# Patient Record
Sex: FEMALE | Race: WHITE | NOT HISPANIC OR LATINO | Employment: FULL TIME | URBAN - METROPOLITAN AREA
[De-identification: names, ages, dates, MRNs, and addresses within clinical notes are randomized per-mention and may not be internally consistent; named-entity substitution may affect disease eponyms.]

---

## 2023-03-16 ENCOUNTER — OFFICE VISIT (OUTPATIENT)
Dept: DIABETES | Facility: CLINIC | Age: 38
End: 2023-03-16
Payer: MEDICAID

## 2023-03-16 DIAGNOSIS — I10 HYPERTENSION GOAL BP (BLOOD PRESSURE) < 130/80: ICD-10-CM

## 2023-03-16 DIAGNOSIS — E78.5 HYPERLIPIDEMIA LDL GOAL <100: ICD-10-CM

## 2023-03-16 DIAGNOSIS — E10.9 TYPE 1 DIABETES MELLITUS WITHOUT COMPLICATION: Primary | ICD-10-CM

## 2023-03-16 DIAGNOSIS — Z96.41 INSULIN PUMP IN PLACE: ICD-10-CM

## 2023-03-16 PROBLEM — Z87.19 HISTORY OF GI BLEED: Status: ACTIVE | Noted: 2020-04-24

## 2023-03-16 PROBLEM — Z97.8 USES SELF-APPLIED CONTINUOUS GLUCOSE MONITORING DEVICE: Status: ACTIVE | Noted: 2022-08-05

## 2023-03-16 PROBLEM — K21.9 GASTROESOPHAGEAL REFLUX DISEASE WITHOUT ESOPHAGITIS: Status: ACTIVE | Noted: 2019-02-06

## 2023-03-16 PROBLEM — H81.09 MENIERE'S DISEASE: Status: ACTIVE | Noted: 2022-06-22

## 2023-03-16 PROCEDURE — 99204 PR OFFICE/OUTPT VISIT, NEW, LEVL IV, 45-59 MIN: ICD-10-PCS | Mod: 95,,, | Performed by: NURSE PRACTITIONER

## 2023-03-16 PROCEDURE — 99204 OFFICE O/P NEW MOD 45 MIN: CPT | Mod: 95,,, | Performed by: NURSE PRACTITIONER

## 2023-03-16 NOTE — PROGRESS NOTES
Telemedicine Visit    The patient location is home  The chief complaint leading to consultation is: Diabetes    Visit type: audiovisual VIA DOXMITY    Face to Face time with patient: 25  50 minutes of total time spent on the encounter, which includes face to face time and non-face to face time preparing to see the patient (eg, review of tests), Obtaining and/or reviewing separately obtained history, Documenting clinical information in the electronic or other health record, Independently interpreting results (not separately reported) and communicating results to the patient/family/caregiver, or Care coordination (not separately reported).  Each patient to whom he or she provides medical services by telemedicine is:  (1) informed of the relationship between the physician and patient and the respective role of any other health care provider with respect to management of the patient; and (2) notified that he or she may decline to receive medical services by telemedicine and may withdraw from such care at any time.  Notes:         Dominga Saldaña is a 37 y.o. 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     CHIEF COMPLAINT: Diabetes Consultation    PCP: Primary Doctor No     The patient was initially diagnosed with diabetes at age 14.   Patient previously followed by me at [facility], last visit 1/6/2023.    Previous failed treatments include:      Social Documentation:  Patient lives in [place] with .  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  Exercise: bowling     Current monitoring regimen: continuous glucose monitor. Device data to be uploaded to media section. See AGP data below.      The patient's Dexcom CGM was downloaded and reviewed. For the past 14 days, patient average glucose was 268 mg/dL, with standard deviation of 48. She was above  range 96% of the time, in range 4% of the time, and below range 0% of the time. The target range for this patient was 70 - 180 mg/dL. Overall, there was a pattern of hyperglycemia..      Current Diabetes related symptoms/problems include hyperglycemia and have been unchanged.     Diabetes related complications:   none.   denies Pancreatitis  denies Gastroparesis  denies DKA  denies Hx/family Hx of MEN2/MTC  denies Frequent UTIs/yeast infections    Cardiovascular Risk Factors: obesity (BMI >30 kg/m2).    Any episodes of hypoglycemia?  Yes. Hypoglycemia treatment reviewed with patient and education to be provided in AVS.   Hypoglycemia Unawareness? yes  Severe hypoglycemia requiring 3rd party? no    Seen by Diabetes Education in last year? yes    Diabetes Medications               insulin lispro (HUMALOG) 100 unit/mL injection Inject into the skin 3 (three) times daily before meals. Injection in pump three times per day - MEDTRONIC INSULIN PUMP     DIABETES DISEASE MANAGEMENT STATUS  Statin: Taking  ACE/ARB: Not taking  Screening or Prevention Patient's value Goal Complete/Controlled?   HgA1C Testing and Control   Lab Results   Component Value Date    HGBA1C 7.2 (H) 01/06/2023      Annually/Less than 8% Yes     Lipid profile : 03/17/2022 Annually No     LDL control Lab Results   Component Value Date    LDLCALC 67 03/17/2022    Annually/Less than 100 mg/dl  Yes     Nephropathy screening No results found for: LABMICR  No results found for: PROTEINUA  No results found for: UTPCR   Annually No     Blood pressure BP Readings from Last 1 Encounters:   01/10/12 100/60    Less than 140/90 Yes     Dilated retinal exam Most Recent Eye Exam Date: Not Found Annually No     Foot exam   Most Recent Foot Exam Date: Not Found Annually No     Patient's medications, allergies, past medical, surgical, social and family histories were reviewed and updated as appropriate.     Review of Systems   Constitutional:  Negative for weight loss.    Eyes:  Negative for blurred vision and double vision.   Cardiovascular:  Negative for chest pain.   Gastrointestinal:  Negative for nausea and vomiting.   Genitourinary:  Negative for frequency.   Musculoskeletal:  Negative for falls.   Neurological:  Negative for dizziness and weakness.   Endo/Heme/Allergies:  Negative for polydipsia.   Psychiatric/Behavioral:  Negative for depression.    All other systems reviewed and are negative.          Physical Exam  Constitutional:       Appearance: Normal appearance.   HENT:      Head: Normocephalic and atraumatic.   Pulmonary:      Effort: No respiratory distress.   Musculoskeletal:      Cervical back: Normal range of motion.   Neurological:      Mental Status: She is alert and oriented to person, place, and time.   Psychiatric:         Mood and Affect: Mood normal.         Behavior: Behavior normal.      There were no vitals taken for this visit.  Wt Readings from Last 3 Encounters:   02/22/12 88 kg (194 lb)   01/10/12 86.6 kg (191 lb)   12/20/11 87.1 kg (192 lb)       LAB REVIEW  Lab Results   Component Value Date     09/07/2011    K 3.4 (L) 09/07/2011     09/07/2011    CO2 21 (L) 09/07/2011    BUN 7 09/07/2011    CREATININE 0.6 09/07/2011    CALCIUM 9.2 09/07/2011    ANIONGAP 12 09/07/2011     No results found for: CPEPTIDE, GLUTAMICACID, INSLNABS  Hemoglobin A1C   Date Value Ref Range Status   01/06/2023 7.2 (H) 4.7 - 5.6 % Final   09/29/2022 7.2 (H) 4.7 - 5.6 % Final   06/30/2022 7.1 (H) 4.7 - 5.6 % Final   05/12/2011 6.1 4.0 - 6.2 % Final       ASSESSMENT    ICD-10-CM ICD-9-CM   1. Type 1 diabetes mellitus without complication  E10.9 250.01   2. Insulin pump in place  Z96.41 V45.85   3. Hypertension goal BP (blood pressure) < 130/80  I10 401.9   4. Hyperlipidemia LDL goal <100  E78.5 272.4        PLAN  Diagnoses and all orders for this visit:    Type 1 diabetes mellitus without complication    Insulin pump in place    Hypertension goal BP (blood  pressure) < 130/80    Hyperlipidemia LDL goal <100        Reviewed pathophysiology of diabetes, complications related to the disease, importance of annual dilated eye exam and daily foot examination. Explained MOA, SE, dosage of medications. Written instructions given and reviewed with patient and patient verbalizes understanding.     Today: For the past 14 days, patient average glucose was 268 mg/dL, with standard deviation of 48. She was above range 96% of the time, in range 4% of the time, and below range 0% of the time. The target range for this patient was 70 - 180 mg/dL. Overall, there was a pattern of hyperglycemia. Patient started new 770G pump last week, states she is unsure is she put in the correct settings. Will set up data sharing with [employer] once patient has login info. Scheduled for new pump training with [employer] next week.     PATIENT INSTRUCTIONS    Call with pump settings today.   Continue Medtronic 770G Insulin Pump  Continue Dexcom G6 CGM      Follow up in about 2 weeks (around 3/30/2023) for VIRTUAL, DEXCOM - SHARING, [employer]    Portions of this note were prepared with [employer] Naturally Speaking voice recognition transcription software. Grammatical errors, including garbled syntax, mangle pronouns, and other bizarre constructions may be attributed to that software system.

## 2023-03-16 NOTE — PATIENT INSTRUCTIONS
PATIENT INSTRUCTIONS    Call with pump settings today.   Continue Medtronic 770G Insulin Pump  Continue Dexcom G6 CGM

## 2023-03-30 ENCOUNTER — OFFICE VISIT (OUTPATIENT)
Dept: DIABETES | Facility: CLINIC | Age: 38
End: 2023-03-30
Payer: MEDICAID

## 2023-03-30 DIAGNOSIS — Z96.41 INSULIN PUMP IN PLACE: ICD-10-CM

## 2023-03-30 DIAGNOSIS — E10.9 TYPE 1 DIABETES MELLITUS WITHOUT COMPLICATION: Primary | ICD-10-CM

## 2023-03-30 DIAGNOSIS — Z97.8 USES SELF-APPLIED CONTINUOUS GLUCOSE MONITORING DEVICE: ICD-10-CM

## 2023-03-30 PROCEDURE — 99214 PR OFFICE/OUTPT VISIT, EST, LEVL IV, 30-39 MIN: ICD-10-PCS | Mod: 95,,, | Performed by: NURSE PRACTITIONER

## 2023-03-30 PROCEDURE — 99214 OFFICE O/P EST MOD 30 MIN: CPT | Mod: 95,,, | Performed by: NURSE PRACTITIONER

## 2023-03-30 NOTE — PATIENT INSTRUCTIONS
PATIENT INSTRUCTIONS     Fasting labs to be done 1 week prior to follow up appointment with me.    Continue Medtronic 770G Insulin Pump  Basal Carb Ratio ISF Target/s AIT   0000 - 1.5  0800 - 1.5  2000 - 2.0 - Increase to 2.4 13 50 140 4 hrs     Continue Dexcom G6 CGM  Blood Sugar Goals:       Fastin-130.       1-2 hours after a meal: Less than 180.         For low blood sugar between 55-69 mg/dL, raise it by following the 15-15 rule: have 15 grams of carbs and check your blood sugar after 15 minutes. If its still below your target range, have another serving. Repeat these steps until its in your target range. Once its in range, eat a nutritious meal or snack to ensure it doesnt get too low again.    These items have about 15 grams of carbs:  4 ounces (½ cup) of juice or regular soda.  1 tablespoon of sugar, honey, or syrup.  3-4 glucose tablets.  1 dose of glucose gel (usually 1 tube; follow instructions).    NOTIFY YOUR DIABETES MANAGEMENT PROVIDER FOR ANY GLUCOSE LESS THAN 70.           [facility]  DIABETES MANAGEMENT VIRTUAL VISITS    Here are a few tips and recommendations for you to be best prepared for your upcoming virtual visit with [facility]:    If you do not have a [facility] account already set up on your smart phone or computer, please call [phone] for assistance. The [facility] Patient Support Team is available Monday - Friday: 7 a.m. - 7 p.m.    If you use a Continuous Glucose Monitor (Dexcom/Pranay/Guardian) or an insulin pump and do not share data automatically using a smartphone, please call to schedule a nurse visit 1-3 days ahead of your virtual visit to download your glucose data.     For patients checking their blood sugar using a glucose monitor with finger sticks, please have a log of blood sugars to review with your provider. You may attach a picture or screenshot in a message to your provider in the [facility] portal.     Our staff will call you the day  before your visit to confirm your appointment. Please call our office, [phone], if you are not able to attend your virtual visit or to reschedule. You can also send a message to our staff in your [facility] portal.     Please log in to your [facility] account for your virtual visit 15-20 minutes prior to appointment time to complete your pre-check questionnaire and televisit consent.     What are the technical requirements for a Virtual Visit?  You must have a smartphone, mobile tablet, laptop and/or desktop computer.  Microphone and webcam capabilities on your device  If using a mobile phone or tablet, you will need:  iOS or Android operating system  The [facility] louie installed and [facility] selected as your healthcare provider  You can find the [facility] louie in the [employer] Store (Lionicalhone) and Google Play Store (Android).  If you do not plan to be at home during your virtual visit, we recommend finding a private, quiet spot for your appointment. Avoid conducting your visit in your vehicle, especially while driving

## 2023-03-30 NOTE — PROGRESS NOTES
The patient location is: Home  The chief complaint leading to consultation is: Diabetes    Visit type: audiovisual via doximity.    Face to Face time with patient: 20  30 minutes of total time spent on the encounter, which includes face to face time and non-face to face time preparing to see the patient (eg, review of tests), Obtaining and/or reviewing separately obtained history, Documenting clinical information in the electronic or other health record, Independently interpreting results (not separately reported) and communicating results to the patient/family/caregiver, or Care coordination (not separately reported).  Each patient to whom he or she provides medical services by telemedicine is:  (1) informed of the relationship between the physician and patient and the respective role of any other health care provider with respect to management of the patient; and (2) notified that he or she may decline to receive medical services by telemedicine and may withdraw from such care at any time.    Notes:    Dominga Saldaña is a 37 y.o. female who presents for a follow up evaluation of Type 1 diabetes mellitus.     CHIEF COMPLAINT: Diabetes Consultation    PCP: Primary Doctor No      Initial visit with me - 3/16/2023    The patient was initially diagnosed with diabetes at age 14.   Patient previously followed by me at [facility], last visit 1/6/2023.     Previous failed treatments include:        Social Documentation:  Patient lives in [place] with .  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  Exercise: bowling     Diabetes related complications:   none.   denies Pancreatitis  denies Gastroparesis  denies DKA  denies Hx/family Hx of MEN2/MTC  denies Frequent UTIs/yeast infections     Cardiovascular Risk Factors: obesity (BMI >30 kg/m2).    Diabetes Medications               insulin lispro (HUMALOG) 100 unit/mL injection Inject into the skin 3 (three) times daily before meals. VIA INSULIN  PUMP.  MEDTRONIC MINIMED 770G       Current monitoring regimen: continuous glucose monitor. Device data to be uploaded to media section. See AGP data below.       The patient's Dexcom CGM was downloaded and reviewed. For the past 14 days, patient average glucose was 199 mg/dL, with standard deviation of 31. She was above range 57% of the time, in range 43% of the time, and below range 0% of the time. The target range for this patient was 70 - 180 mg/dL. Overall, there was a pattern of improvement since last visit.   .    Recent hypoglycemic episodes: No.   Patient compliant with glucose checks and medication administration? Yes    DIABETES MANAGEMENT STATUS  Statin: Not taking  ACE/ARB: Not taking  Screening or Prevention Patient's value Goal Complete/Controlled?   HgA1C Testing and Control   Lab Results   Component Value Date    HGBA1C 7.2 (H) 01/06/2023      Annually/Less than 8% Yes     Lipid profile : 03/17/2022 Annually No     LDL control Lab Results   Component Value Date    LDLCALC 67 03/17/2022    Annually/Less than 100 mg/dl  No     Nephropathy screening No results found for: LABMICR  No results found for: PROTEINUA  No results found for: UTPCR   Annually No     Blood pressure BP Readings from Last 1 Encounters:   01/10/12 100/60    Less than 140/90 Yes     Dilated retinal exam Most Recent Eye Exam Date: Not Found Annually No     Foot exam   Most Recent Foot Exam Date: Not Found Annually No     Patient's medications, allergies, surgical, social and family histories were reviewed and updated as appropriate.     Review of Systems   Constitutional:  Negative for weight loss.   Eyes:  Negative for blurred vision and double vision.   Cardiovascular:  Negative for chest pain.   Gastrointestinal:  Negative for nausea and vomiting.   Genitourinary:  Negative for frequency.   Musculoskeletal:  Negative for falls.   Neurological:  Negative for dizziness and weakness.   Endo/Heme/Allergies:  Negative for polydipsia.    Psychiatric/Behavioral:  Negative for depression.    All other systems reviewed and are negative.     Physical Exam  Constitutional:       Appearance: Normal appearance.   HENT:      Head: Normocephalic and atraumatic.   Pulmonary:      Effort: No respiratory distress.   Musculoskeletal:      Cervical back: Normal range of motion.   Neurological:      Mental Status: She is alert and oriented to person, place, and time.   Psychiatric:         Mood and Affect: Mood normal.         Behavior: Behavior normal.      There were no vitals taken for this visit.  Wt Readings from Last 3 Encounters:   02/22/12 88 kg (194 lb)   01/10/12 86.6 kg (191 lb)   12/20/11 87.1 kg (192 lb)       ASSESSMENT    ICD-10-CM ICD-9-CM   1. Type 1 diabetes mellitus without complication  E10.9 250.01   2. INSULIN PUMP - [employer] 770G  Z96.41 V45.85   3. CGM - DEXCOM SHARING  Z97.8 V49.89       PLAN  Diagnoses and all orders for this visit:    Type 1 diabetes mellitus without complication  -     Comprehensive Metabolic Panel; Future  -     Hemoglobin A1C; Future  -     Lipid Panel; Future  -     Microalbumin/Creatinine Ratio, Urine; Future    INSULIN PUMP - [employer] 770G    CGM - DEXCOM SHARING        Reviewed pathophysiology of diabetes, complications related to the disease, importance of annual dilated eye exam and daily foot examination. Explained MOA, SE, dosage of medications. Written instructions given and reviewed with patient and patient verbalizes understanding.     3/16/2023: For the past 14 days, patient average glucose was 268 mg/dL, with standard deviation of 48. She was above range 96% of the time, in range 4% of the time, and below range 0% of the time. The target range for this patient was 70 - 180 mg/dL. Overall, there was a pattern of hyperglycemia. Patient started new 770G pump last week, states she is unsure is she put in the correct settings. Will set up data sharing with [employer] once patient has login info. Scheduled  for new pump training with [employer] next week.     3/30/23 - average glucose was 199 mg/dL, with standard deviation of 31. She was above range 57% of the time, in range 43% of the time, and below range 0% of the time.     PATIENT INSTRUCTIONS     Fasting labs to be done 1 week prior to follow up appointment with me.    Continue Med[employer]onic 770G Insulin Pump  Basal Carb Ratio ISF Target/s AIT   0000 - 1.5  0800 - 1.5  2000 - 2.0 - Increase to 2.4 13 50 140 4 hrs     Continue Dexcom G6 CGM  Blood Sugar Goals:       Fastin-130.       1-2 hours after a meal: Less than 180.       Follow up in about 4 weeks (around 2023) for VIRTUAL, DEXCOM - SHARING, [employer], FASTING LABS 1 WEEK PRIOR TO APPOINTMENT.    Portions of this note were prepared with [employer] Naturally Speaking voice recognition transcription software. Grammatical errors, including garbled syntax, mangle pronouns, and other bizarre constructions may be attributed to that software system.

## 2023-04-19 DIAGNOSIS — E10.9 TYPE 1 DIABETES MELLITUS WITHOUT COMPLICATION: Primary | ICD-10-CM

## 2023-04-19 RX ORDER — INSULIN LISPRO 100 [IU]/ML
100 INJECTION, SOLUTION INTRAVENOUS; SUBCUTANEOUS DAILY
Qty: 90 ML | Refills: 3 | Status: SHIPPED | OUTPATIENT
Start: 2023-04-19 | End: 2024-03-12 | Stop reason: CLARIF

## 2023-04-20 ENCOUNTER — LAB VISIT (OUTPATIENT)
Dept: LAB | Facility: HOSPITAL | Age: 38
End: 2023-04-20
Attending: NURSE PRACTITIONER
Payer: MEDICAID

## 2023-04-20 DIAGNOSIS — E10.9 TYPE 1 DIABETES MELLITUS WITHOUT COMPLICATION: ICD-10-CM

## 2023-04-20 LAB
ALBUMIN SERPL BCP-MCNC: 3.7 G/DL (ref 3.5–5.2)
ALP SERPL-CCNC: 50 U/L (ref 55–135)
ALT SERPL W/O P-5'-P-CCNC: 31 U/L (ref 10–44)
ANION GAP SERPL CALC-SCNC: 9 MMOL/L (ref 8–16)
AST SERPL-CCNC: 18 U/L (ref 10–40)
BILIRUB SERPL-MCNC: 0.2 MG/DL (ref 0.1–1)
BUN SERPL-MCNC: 10 MG/DL (ref 6–20)
CALCIUM SERPL-MCNC: 9.4 MG/DL (ref 8.7–10.5)
CHLORIDE SERPL-SCNC: 106 MMOL/L (ref 95–110)
CHOLEST SERPL-MCNC: 195 MG/DL (ref 120–199)
CHOLEST/HDLC SERPL: 7.5 {RATIO} (ref 2–5)
CO2 SERPL-SCNC: 23 MMOL/L (ref 23–29)
CREAT SERPL-MCNC: 0.9 MG/DL (ref 0.5–1.4)
EST. GFR  (NO RACE VARIABLE): >60 ML/MIN/1.73 M^2
ESTIMATED AVG GLUCOSE: 166 MG/DL (ref 68–131)
GLUCOSE SERPL-MCNC: 143 MG/DL (ref 70–110)
HBA1C MFR BLD: 7.4 % (ref 4–5.6)
HDLC SERPL-MCNC: 26 MG/DL (ref 40–75)
HDLC SERPL: 13.3 % (ref 20–50)
LDLC SERPL CALC-MCNC: ABNORMAL MG/DL (ref 63–159)
NONHDLC SERPL-MCNC: 169 MG/DL
POTASSIUM SERPL-SCNC: 4.4 MMOL/L (ref 3.5–5.1)
PROT SERPL-MCNC: 6.7 G/DL (ref 6–8.4)
SODIUM SERPL-SCNC: 138 MMOL/L (ref 136–145)
TRIGL SERPL-MCNC: 549 MG/DL (ref 30–150)

## 2023-04-20 PROCEDURE — 80061 LIPID PANEL: CPT | Performed by: NURSE PRACTITIONER

## 2023-04-20 PROCEDURE — 83036 HEMOGLOBIN GLYCOSYLATED A1C: CPT | Performed by: NURSE PRACTITIONER

## 2023-04-20 PROCEDURE — 80053 COMPREHEN METABOLIC PANEL: CPT | Performed by: NURSE PRACTITIONER

## 2023-04-20 PROCEDURE — 36415 COLL VENOUS BLD VENIPUNCTURE: CPT | Mod: PO | Performed by: NURSE PRACTITIONER

## 2023-04-27 ENCOUNTER — OFFICE VISIT (OUTPATIENT)
Dept: DIABETES | Facility: CLINIC | Age: 38
End: 2023-04-27
Payer: MEDICAID

## 2023-04-27 DIAGNOSIS — Z96.41 INSULIN PUMP IN PLACE: ICD-10-CM

## 2023-04-27 DIAGNOSIS — Z97.8 USES SELF-APPLIED CONTINUOUS GLUCOSE MONITORING DEVICE: ICD-10-CM

## 2023-04-27 DIAGNOSIS — E78.5 HYPERLIPIDEMIA LDL GOAL <100: Primary | ICD-10-CM

## 2023-04-27 DIAGNOSIS — I10 HYPERTENSION GOAL BP (BLOOD PRESSURE) < 130/80: ICD-10-CM

## 2023-04-27 DIAGNOSIS — E10.9 TYPE 1 DIABETES MELLITUS WITHOUT COMPLICATION: ICD-10-CM

## 2023-04-27 PROCEDURE — 99214 OFFICE O/P EST MOD 30 MIN: CPT | Mod: 95,,, | Performed by: NURSE PRACTITIONER

## 2023-04-27 PROCEDURE — 3066F PR DOCUMENTATION OF TREATMENT FOR NEPHROPATHY: ICD-10-PCS | Mod: CPTII,95,, | Performed by: NURSE PRACTITIONER

## 2023-04-27 PROCEDURE — 3061F NEG MICROALBUMINURIA REV: CPT | Mod: CPTII,95,, | Performed by: NURSE PRACTITIONER

## 2023-04-27 PROCEDURE — 3066F NEPHROPATHY DOC TX: CPT | Mod: CPTII,95,, | Performed by: NURSE PRACTITIONER

## 2023-04-27 PROCEDURE — 99214 PR OFFICE/OUTPT VISIT, EST, LEVL IV, 30-39 MIN: ICD-10-PCS | Mod: 95,,, | Performed by: NURSE PRACTITIONER

## 2023-04-27 PROCEDURE — 4010F PR ACE/ARB THEARPY RXD/TAKEN: ICD-10-PCS | Mod: CPTII,95,, | Performed by: NURSE PRACTITIONER

## 2023-04-27 PROCEDURE — 3061F PR NEG MICROALBUMINURIA RESULT DOCUMENTED/REVIEW: ICD-10-PCS | Mod: CPTII,95,, | Performed by: NURSE PRACTITIONER

## 2023-04-27 PROCEDURE — 3051F HG A1C>EQUAL 7.0%<8.0%: CPT | Mod: CPTII,95,, | Performed by: NURSE PRACTITIONER

## 2023-04-27 PROCEDURE — 3051F PR MOST RECENT HEMOGLOBIN A1C LEVEL 7.0 - < 8.0%: ICD-10-PCS | Mod: CPTII,95,, | Performed by: NURSE PRACTITIONER

## 2023-04-27 PROCEDURE — 4010F ACE/ARB THERAPY RXD/TAKEN: CPT | Mod: CPTII,95,, | Performed by: NURSE PRACTITIONER

## 2023-04-27 NOTE — PROGRESS NOTES
The patient location is: Home  The chief complaint leading to consultation is: Diabetes    Visit type: audiovisual via doximity.    Face to Face time with patient: 20  30 minutes of total time spent on the encounter, which includes face to face time and non-face to face time preparing to see the patient (eg, review of tests), Obtaining and/or reviewing separately obtained history, Documenting clinical information in the electronic or other health record, Independently interpreting results (not separately reported) and communicating results to the patient/family/caregiver, or Care coordination (not separately reported).  Each patient to whom he or she provides medical services by telemedicine is:  (1) informed of the relationship between the physician and patient and the respective role of any other health care provider with respect to management of the patient; and (2) notified that he or she may decline to receive medical services by telemedicine and may withdraw from such care at any time.    Notes:    Dominga Saldaña is a 37 y.o. female who presents for a follow up evaluation of Type 1 diabetes mellitus.     CHIEF COMPLAINT: Diabetes Consultation    PCP: Primary Doctor No      Initial visit with me - 3/16/2023    The patient was initially diagnosed with diabetes at age 14.   Patient previously followed by me at [facility], last visit 1/6/2023.     Previous failed treatments include:        Social Documentation:  Patient lives in [place] with .  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  Exercise: bowling     Diabetes related complications:   none.   denies Pancreatitis  denies Gastroparesis  denies DKA  denies Hx/family Hx of MEN2/MTC  denies Frequent UTIs/yeast infections     Cardiovascular Risk Factors: obesity (BMI >30 kg/m2).    Diabetes Medications               insulin lispro 100 unit/mL Crtg Inject 1 mL (100 Units total) into the skin once daily. USE WITH INPEN    insulin lispro 100  unit/mL injection Inject 100 Units into the skin once daily. USE WITH INSULIN PUMP         Current monitoring regimen: continuous glucose monitor. Device data to be uploaded to media section. See AGP data below.       The patient's Dexcom CGM was downloaded and reviewed. For the past 14 days, patient average glucose was 173 mg/dL, with standard deviation of 52. She was above range 60% of the time, in range 40% of the time, and below range 10% of the time. The target range for this patient was 70 - 180 mg/dL. Overall, there was a pattern of post prandial hyperglycemia after supper.    .    Recent hypoglycemic episodes: No.   Patient compliant with glucose checks and medication administration? Yes    DIABETES MANAGEMENT STATUS  Statin: Not taking  ACE/ARB: Not taking  Screening or Prevention Patient's value Goal Complete/Controlled?   HgA1C Testing and Control   Lab Results   Component Value Date    HGBA1C 7.4 (H) 04/20/2023      Annually/Less than 8% Yes     Lipid profile : 04/20/2023 Annually No     LDL control Lab Results   Component Value Date    LDLCALC Invalid, Trig>400.0 04/20/2023    Annually/Less than 100 mg/dl  No     Nephropathy screening Lab Results   Component Value Date    LABMICR 6.0 04/20/2023     No results found for: PROTEINUA  No results found for: UTPCR   Annually No     Blood pressure BP Readings from Last 1 Encounters:   01/10/12 100/60    Less than 140/90 Yes     Dilated retinal exam Most Recent Eye Exam Date: Not Found Annually No     Foot exam   Most Recent Foot Exam Date: Not Found Annually No     Patient's medications, allergies, surgical, social and family histories were reviewed and updated as appropriate.     Review of Systems   Constitutional:  Negative for weight loss.   Eyes:  Negative for blurred vision and double vision.   Cardiovascular:  Negative for chest pain.   Gastrointestinal:  Negative for nausea and vomiting.   Genitourinary:  Negative for frequency.   Musculoskeletal:   Negative for falls.   Neurological:  Negative for dizziness and weakness.   Endo/Heme/Allergies:  Negative for polydipsia.   Psychiatric/Behavioral:  Negative for depression.    All other systems reviewed and are negative.     Physical Exam  Constitutional:       Appearance: Normal appearance.   HENT:      Head: Normocephalic and atraumatic.   Pulmonary:      Effort: No respiratory distress.   Musculoskeletal:      Cervical back: Normal range of motion.   Neurological:      Mental Status: She is alert and oriented to person, place, and time.   Psychiatric:         Mood and Affect: Mood normal.         Behavior: Behavior normal.      There were no vitals taken for this visit.  Wt Readings from Last 3 Encounters:   02/22/12 88 kg (194 lb)   01/10/12 86.6 kg (191 lb)   12/20/11 87.1 kg (192 lb)       ASSESSMENT    ICD-10-CM ICD-9-CM   1. Hyperlipidemia LDL goal <100  E78.5 272.4   2. Hypertension goal BP (blood pressure) < 130/80  I10 401.9   3. Type 1 diabetes mellitus without complication  E10.9 250.01   4. INSULIN PUMP - [employer] 770G  Z96.41 V45.85   5. CGM - DEXCOM SHARING  Z97.8 V49.89       PLAN  Diagnoses and all orders for this visit:    Hyperlipidemia LDL goal <100    Hypertension goal BP (blood pressure) < 130/80    Type 1 diabetes mellitus without complication    INSULIN PUMP - MEDTR[employer]IC 770G    CGM - DEXCOM SHARING        Reviewed pathophysiology of diabetes, complications related to the disease, importance of annual dilated eye exam and daily foot examination. Explained MOA, SE, dosage of medications. Written instructions given and reviewed with patient and patient verbalizes understanding.     3/16/2023: For the past 14 days, patient average glucose was 268 mg/dL, with standard deviation of 48. She was above range 96% of the time, in range 4% of the time, and below range 0% of the time. The target range for this patient was 70 - 180 mg/dL. Overall, there was a pattern of hyperglycemia. Patient started  new 770G pump last week, states she is unsure is she put in the correct settings. Will set up data sharing with [employer] once patient has login info. Scheduled for new pump training with [employer] next week.     3/30/23 - average glucose was 199 mg/dL, with standard deviation of 31. She was above range 57% of the time, in range 43% of the time, and below range 0% of the time.    2023 - overall improvement in glucoses, avg 173, no lows. F/u 6 weeks.      PATIENT INSTRUCTIONS     Fasting labs to be done 1 week prior to follow up appointment with me.    Diabetic Foot Exam deferred today due to virtual visit. Will plan to be done at next in-person visit.   Have your eye care provider fax last eye exam to our office.   Fax [phone].      Continue Medtronic 770G Insulin Pump  Basal Carb Ratio ISF Target/s AIT   0000 - 1.5  0800 - 1.5  2000 - 2.4   13 50 140 4 hrs     Continue Dexcom G6 CGM  Blood Sugar Goals:       Fastin-130.       1-2 hours after a meal: Less than 180.       U100 Pump Failure Plan:   We have decided to develop a plan in the event that your pump breaks or is nonfunctioning. After 4 hours without pump use, you should begin to consider putting your Pump Failure Back Up Plan into action especially if you foresee that you may not be able to use your pump for more than 20 hours. Since you are currently using short acting insulin (Humalog/Novolog/Admelog/Novolin R) in your pump, you will need access to your short acting insulin vial, syringes, and a back up long acting insulin in the event of a pump failure. I have sent a prescription for a long acting insulin to your pharmacy for use during your emergency pump failure plan. It is important that you keep both types of insulin/syringes with you so that you may have access to it at least 3 times daily if needed. This medication and syringes should be brought with you on overnight trips in the case of an emergency pump failure. This means making a  plan to keeping your insulin and syringes at school, work, or other places you frequent. If needed, please reach out to my office about any letters you may need for permission to keep these items for emergency purposes. We will outline your plan for pump failure emergencies below, but please remember to contact the insulin pump company (toll free number is printed on the label on the back of the insulin pump) and our office if you have a pump failure. When the insulin pump is restarted, do not restart basal rates until at least 22 hours after the last long acting insulin injection. You can set a 0% temporary basal setting that will last until this time and use your pump to bolus for meals and correction. If you need help with these feature, please call your insulin pump company tech support line or ask them in anticipation of this action.     Dosing:   If the insulin pump is non functional and discontinued for anticipated more than 20 hours, please give daily injections of:    Long Acting Insulin Dosing:   Lantus 40 units daily    Short Acting Insulin Dosing:   Novolog per IC of 13 (or 1 unit for every 13g of carbs) and ISF/CF of 50 (or 1 unit for every 50 pts above goal blood sugar of 140)  These settings should be in your [employer] [facility].     Follow up in about 6 weeks (around 6/8/2023) for VIRTUAL, DEXCOM - SHARING, [employer].    Portions of this note were prepared with [employer] Naturally Speaking voice recognition transcription software. Grammatical errors, including garbled syntax, mangle pronouns, and other bizarre constructions may be attributed to that software system.

## 2023-04-27 NOTE — PATIENT INSTRUCTIONS
PATIENT INSTRUCTIONS     Fasting labs to be done 1 week prior to follow up appointment with me.    Diabetic Foot Exam deferred today due to virtual visit. Will plan to be done at next in-person visit.   Have your eye care provider fax last eye exam to our office.   Fax [phone].      Continue Medtronic 770G Insulin Pump  Basal Carb Ratio ISF Target/s AIT   0000 - 1.5  0800 - 1.5  2000 - 2.4   13 50 140 4 hrs     Continue Dexcom G6 CGM  Blood Sugar Goals:       Fastin-130.       1-2 hours after a meal: Less than 180.       U100 Pump Failure Plan:   We have decided to develop a plan in the event that your pump breaks or is nonfunctioning. After 4 hours without pump use, you should begin to consider putting your Pump Failure Back Up Plan into action especially if you foresee that you may not be able to use your pump for more than 20 hours. Since you are currently using short acting insulin (Humalog/Novolog/Admelog/Novolin R) in your pump, you will need access to your short acting insulin vial, syringes, and a back up long acting insulin in the event of a pump failure. I have sent a prescription for a long acting insulin to your pharmacy for use during your emergency pump failure plan. It is important that you keep both types of insulin/syringes with you so that you may have access to it at least 3 times daily if needed. This medication and syringes should be brought with you on overnight trips in the case of an emergency pump failure. This means making a plan to keeping your insulin and syringes at school, work, or other places you frequent. If needed, please reach out to my office about any letters you may need for permission to keep these items for emergency purposes. We will outline your plan for pump failure emergencies below, but please remember to contact the insulin pump company (toll free number is printed on the label on the back of the insulin pump) and our office if you have a pump failure. When the  insulin pump is restarted, do not restart basal rates until at least 22 hours after the last long acting insulin injection. You can set a 0% temporary basal setting that will last until this time and use your pump to bolus for meals and correction. If you need help with these feature, please call your insulin pump company tech support line or ask them in anticipation of this action.     Dosing:   If the insulin pump is non functional and discontinued for anticipated more than 20 hours, please give daily injections of:    Long Acting Insulin Dosing:   Lantus 40 units daily    Short Acting Insulin Dosing:   Novolog per IC of 13 (or 1 unit for every 13g of carbs) and ISF/CF of 50 (or 1 unit for every 50 pts above goal blood sugar of 140)  These settings should be in your [employer] [facility].

## 2023-06-15 ENCOUNTER — OFFICE VISIT (OUTPATIENT)
Dept: DIABETES | Facility: CLINIC | Age: 38
End: 2023-06-15
Payer: MEDICAID

## 2023-06-15 ENCOUNTER — TELEPHONE (OUTPATIENT)
Dept: DIABETES | Facility: CLINIC | Age: 38
End: 2023-06-15
Payer: MEDICAID

## 2023-06-15 DIAGNOSIS — E10.9 TYPE 1 DIABETES MELLITUS WITHOUT COMPLICATION: Primary | ICD-10-CM

## 2023-06-15 DIAGNOSIS — I10 HYPERTENSION GOAL BP (BLOOD PRESSURE) < 130/80: ICD-10-CM

## 2023-06-15 DIAGNOSIS — E78.5 HYPERLIPIDEMIA LDL GOAL <100: ICD-10-CM

## 2023-06-15 DIAGNOSIS — Z97.8 USES SELF-APPLIED CONTINUOUS GLUCOSE MONITORING DEVICE: ICD-10-CM

## 2023-06-15 DIAGNOSIS — Z96.41 INSULIN PUMP IN PLACE: ICD-10-CM

## 2023-06-15 PROCEDURE — 3061F NEG MICROALBUMINURIA REV: CPT | Mod: CPTII,95,, | Performed by: NURSE PRACTITIONER

## 2023-06-15 PROCEDURE — 99214 OFFICE O/P EST MOD 30 MIN: CPT | Mod: 95,,, | Performed by: NURSE PRACTITIONER

## 2023-06-15 PROCEDURE — 4010F PR ACE/ARB THEARPY RXD/TAKEN: ICD-10-PCS | Mod: CPTII,95,, | Performed by: NURSE PRACTITIONER

## 2023-06-15 PROCEDURE — 99214 PR OFFICE/OUTPT VISIT, EST, LEVL IV, 30-39 MIN: ICD-10-PCS | Mod: 95,,, | Performed by: NURSE PRACTITIONER

## 2023-06-15 PROCEDURE — 3061F PR NEG MICROALBUMINURIA RESULT DOCUMENTED/REVIEW: ICD-10-PCS | Mod: CPTII,95,, | Performed by: NURSE PRACTITIONER

## 2023-06-15 PROCEDURE — 3051F PR MOST RECENT HEMOGLOBIN A1C LEVEL 7.0 - < 8.0%: ICD-10-PCS | Mod: CPTII,95,, | Performed by: NURSE PRACTITIONER

## 2023-06-15 PROCEDURE — 3066F PR DOCUMENTATION OF TREATMENT FOR NEPHROPATHY: ICD-10-PCS | Mod: CPTII,95,, | Performed by: NURSE PRACTITIONER

## 2023-06-15 PROCEDURE — 4010F ACE/ARB THERAPY RXD/TAKEN: CPT | Mod: CPTII,95,, | Performed by: NURSE PRACTITIONER

## 2023-06-15 PROCEDURE — 3051F HG A1C>EQUAL 7.0%<8.0%: CPT | Mod: CPTII,95,, | Performed by: NURSE PRACTITIONER

## 2023-06-15 PROCEDURE — 3066F NEPHROPATHY DOC TX: CPT | Mod: CPTII,95,, | Performed by: NURSE PRACTITIONER

## 2023-06-15 NOTE — Clinical Note
VIRTUAL, 4 weeks.  FASTING LABS 1 WEEK PRIOR TO APPOINTMENT DEXCOM - [facility], [facility]  Fax facesheet to [phone] for upgrade to 780G and Guardian 4 CGM.

## 2023-06-15 NOTE — PATIENT INSTRUCTIONS
PATIENT INSTRUCTIONS     Fasting labs to be done 1 week prior to follow up appointment with me.    Diabetic Foot Exam deferred today due to virtual visit. Will plan to be done at next in-person visit.   Have your eye care provider fax last eye exam to our office.   Fax [phone].      Continue Medtronic 770G Insulin Pump - Will start order process to upgrade to 780G with Guardian Connect 4.     Basal Carb Ratio ISF Target/s AIT   0000 - 1.5  0800 - 1.5  2000 - 2.4   13 50 140 4 hrs     Continue Dexcom G6 CGM  Blood Sugar Goals:       Fastin-130.       1-2 hours after a meal: Less than 180.       U100 Pump Failure Plan:   We have decided to develop a plan in the event that your pump breaks or is nonfunctioning. After 4 hours without pump use, you should begin to consider putting your Pump Failure Back Up Plan into action especially if you foresee that you may not be able to use your pump for more than 20 hours. Since you are currently using short acting insulin (Humalog/Novolog/Admelog/Novolin R) in your pump, you will need access to your short acting insulin vial, syringes, and a back up long acting insulin in the event of a pump failure. I have sent a prescription for a long acting insulin to your pharmacy for use during your emergency pump failure plan. It is important that you keep both types of insulin/syringes with you so that you may have access to it at least 3 times daily if needed. This medication and syringes should be brought with you on overnight trips in the case of an emergency pump failure. This means making a plan to keeping your insulin and syringes at school, work, or other places you frequent. If needed, please reach out to my office about any letters you may need for permission to keep these items for emergency purposes. We will outline your plan for pump failure emergencies below, but please remember to contact the insulin pump company (toll free number is printed on the label on the  back of the insulin pump) and our office if you have a pump failure. When the insulin pump is restarted, do not restart basal rates until at least 22 hours after the last long acting insulin injection. You can set a 0% temporary basal setting that will last until this time and use your pump to bolus for meals and correction. If you need help with these feature, please call your insulin pump company tech support line or ask them in anticipation of this action.     Dosing:   If the insulin pump is non functional and discontinued for anticipated more than 20 hours, please give daily injections of:    Long Acting Insulin Dosing:   Lantus 40 units daily    Short Acting Insulin Dosing:   Novolog per IC of 13 (or 1 unit for every 13g of carbs) and ISF/CF of 50 (or 1 unit for every 50 pts above goal blood sugar of 140)  These settings should be in your [facility] [facility].

## 2023-06-15 NOTE — TELEPHONE ENCOUNTER
----- Message from MILENA Brumfield sent at 6/15/2023  2:50 PM CDT -----  VIRTUAL, 4 weeks.   FASTING LABS 1 WEEK PRIOR TO APPOINTMENT  DEXCOM - [email or web address], [email or web address]    Fax facesheet to [phone] for upgrade to 780G and Guardian 4 CGM.

## 2023-06-15 NOTE — PROGRESS NOTES
The patient location is: Home  The chief complaint leading to consultation is: Diabetes    Visit type: audiovisual      Face to Face time with patient: 20  30 minutes of total time spent on the encounter, which includes face to face time and non-face to face time preparing to see the patient (eg, review of tests), Obtaining and/or reviewing separately obtained history, Documenting clinical information in the electronic or other health record, Independently interpreting results (not separately reported) and communicating results to the patient/family/caregiver, or Care coordination (not separately reported).  Each patient to whom he or she provides medical services by telemedicine is:  (1) informed of the relationship between the physician and patient and the respective role of any other health care provider with respect to management of the patient; and (2) notified that he or she may decline to receive medical services by telemedicine and may withdraw from such care at any time.    Notes:    Dominga Saldaña is a 37 y.o. female who presents for a follow up evaluation of Type 1 diabetes mellitus.     CHIEF COMPLAINT: Diabetes Consultation    PCP: Primary Doctor No      Initial visit with me - 3/16/2023    The patient was initially diagnosed with diabetes at age 14.   Patient previously followed by me at [facility], last visit 1/6/2023.     Previous failed treatments include:        Social Documentation:  Patient lives in [place] with .  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  Exercise: bowling     Diabetes related complications:   none.   denies Pancreatitis  denies Gastroparesis  denies DKA  denies Hx/family Hx of MEN2/MTC  denies Frequent UTIs/yeast infections     Cardiovascular Risk Factors: obesity (BMI >30 kg/m2).    Diabetes Medications               insulin lispro 100 unit/mL Crtg Inject 1 mL (100 Units total) into the skin once daily. USE WITH INPEN    insulin lispro 100 unit/mL  injection Inject 100 Units into the skin once daily. USE WITH INSULIN PUMP         Current monitoring regimen: continuous glucose monitor. Device data to be uploaded to media section. See AGP data below.       The patient's Dexcom CGM was downloaded and reviewed. For the past 14 days, patient average glucose was 208 mg/dL, with standard deviation of 28. She was above range 64% of the time, in range 36% of the time, and below range 0% of the time. The target range for this patient was 70 - 180 mg/dL. Overall, there was a pattern of hyperglycemia. Patient not bolusing for carbs. States she would enter them, but forget to hit deliver. Also recently ill with Covid.        Recent hypoglycemic episodes: No.   Patient compliant with glucose checks and medication administration? Yes    DIABETES MANAGEMENT STATUS  Statin: Not taking  ACE/ARB: Not taking  Screening or Prevention Patient's value Goal Complete/Controlled?   HgA1C Testing and Control   Lab Results   Component Value Date    HGBA1C 7.4 (H) 04/20/2023      Annually/Less than 8% Yes     Lipid profile : 04/20/2023 Annually No     LDL control Lab Results   Component Value Date    LDLCALC Invalid, Trig>400.0 04/20/2023    Annually/Less than 100 mg/dl  No     Nephropathy screening Lab Results   Component Value Date    LABMICR 6.0 04/20/2023     No results found for: PROTEINUA  No results found for: UTPCR   Annually No     Blood pressure BP Readings from Last 1 Encounters:   01/10/12 100/60    Less than 140/90 Yes     Dilated retinal exam Most Recent Eye Exam Date: Not Found Annually No     Foot exam   Most Recent Foot Exam Date: Not Found Annually No     Patient's medications, allergies, surgical, social and family histories were reviewed and updated as appropriate.     Review of Systems   Constitutional:  Negative for weight loss.   Eyes:  Negative for blurred vision and double vision.   Cardiovascular:  Negative for chest pain.   Gastrointestinal:  Negative for nausea  and vomiting.   Genitourinary:  Negative for frequency.   Musculoskeletal:  Negative for falls.   Neurological:  Negative for dizziness and weakness.   Endo/Heme/Allergies:  Negative for polydipsia.   Psychiatric/Behavioral:  Negative for depression.    All other systems reviewed and are negative.     Physical Exam  Constitutional:       Appearance: Normal appearance.   HENT:      Head: Normocephalic and atraumatic.   Pulmonary:      Effort: No respiratory distress.   Musculoskeletal:      Cervical back: Normal range of motion.   Neurological:      Mental Status: She is alert and oriented to person, place, and time.   Psychiatric:         Mood and Affect: Mood normal.         Behavior: Behavior normal.      There were no vitals taken for this visit.  Wt Readings from Last 3 Encounters:   02/22/12 88 kg (194 lb)   01/10/12 86.6 kg (191 lb)   12/20/11 87.1 kg (192 lb)       ASSESSMENT    ICD-10-CM ICD-9-CM   1. Type 1 diabetes mellitus without complication  E10.9 250.01   2. INSULIN PUMP - MED[employer] 770G  Z96.41 V45.85   3. CGM - DEXCOM SHARING  Z97.8 V49.89   4. Hypertension goal BP (blood pressure) < 130/80  I10 401.9   5. Hyperlipidemia LDL goal <100  E78.5 272.4         PLAN  Diagnoses and all orders for this visit:    Type 1 diabetes mellitus without complication    INSULIN PUMP - MEDTRONIC 770G    CGM - DEXCOM SHARING    Hypertension goal BP (blood pressure) < 130/80    Hyperlipidemia LDL goal <100          Reviewed pathophysiology of diabetes, complications related to the disease, importance of annual dilated eye exam and daily foot examination. Explained MOA, SE, dosage of medications. Written instructions given and reviewed with patient and patient verbalizes understanding.     3/16/2023: For the past 14 days, patient average glucose was 268 mg/dL, with standard deviation of 48. She was above range 96% of the time, in range 4% of the time, and below range 0% of the time. The target range for this patient was  70 - 180 mg/dL. Overall, there was a pattern of hyperglycemia. Patient started new 770G pump last week, states she is unsure is she put in the correct settings. Will set up data sharing with [employer] once patient has login info. Scheduled for new pump training with [employer] next week.     3/30/23 - average glucose was 199 mg/dL, with standard deviation of 31. She was above range 57% of the time, in range 43% of the time, and below range 0% of the time.    2023 - overall improvement in glucoses, avg 173, no lows. F/u 6 weeks.     6/15/2023 - patient had covid 2 weeks ago and glucoses were higher, also does not think she was putting in her corrections/boluses in correctly, not hitting deliver. Will upgrade to 780G with Guardian 4 CGM.      PATIENT INSTRUCTIONS     Fasting labs to be done 1 week prior to follow up appointment with me.    Diabetic Foot Exam deferred today due to virtual visit. Will plan to be done at next in-person visit.   Have your eye care provider fax last eye exam to our office.   Fax [phone].      Continue Med[employer]onic 770G Insulin Pump - Will start order process to upgrade to 780G with Guardian Connect 4.     Basal Carb Ratio ISF Target/s AIT   0000 - 1.5  0800 - 1.5  2000 - 2.4   13 50 140 4 hrs     Continue Dexcom G6 CGM  Blood Sugar Goals:       Fastin-130.       1-2 hours after a meal: Less than 180.       U100 Pump Failure Plan:   We have decided to develop a plan in the event that your pump breaks or is nonfunctioning. After 4 hours without pump use, you should begin to consider putting your Pump Failure Back Up Plan into action especially if you foresee that you may not be able to use your pump for more than 20 hours. Since you are currently using short acting insulin (Humalog/Novolog/Admelog/Novolin R) in your pump, you will need access to your short acting insulin vial, syringes, and a back up long acting insulin in the event of a pump failure. I have sent a prescription for  a long acting insulin to your pharmacy for use during your emergency pump failure plan. It is important that you keep both types of insulin/syringes with you so that you may have access to it at least 3 times daily if needed. This medication and syringes should be brought with you on overnight trips in the case of an emergency pump failure. This means making a plan to keeping your insulin and syringes at school, work, or other places you frequent. If needed, please reach out to my office about any letters you may need for permission to keep these items for emergency purposes. We will outline your plan for pump failure emergencies below, but please remember to contact the insulin pump company (toll free number is printed on the label on the back of the insulin pump) and our office if you have a pump failure. When the insulin pump is restarted, do not restart basal rates until at least 22 hours after the last long acting insulin injection. You can set a 0% temporary basal setting that will last until this time and use your pump to bolus for meals and correction. If you need help with these feature, please call your insulin pump company tech support line or ask them in anticipation of this action.     Dosing:   If the insulin pump is non functional and discontinued for anticipated more than 20 hours, please give daily injections of:    Long Acting Insulin Dosing:   Lantus 40 units daily    Short Acting Insulin Dosing:   Novolog per IC of 13 (or 1 unit for every 13g of carbs) and ISF/CF of 50 (or 1 unit for every 50 pts above goal blood sugar of 140)  These settings should be in your [employer] [facility].     Follow up in about 4 weeks (around 7/13/2023) for VIRTUAL, DEXCOM - [employer]ys[employer], [employer], FASTING LABS 1 WEEK PRIOR TO APPOINTMENT.    Portions of this note were prepared with [employer] voice recognition transcription software. Grammatical errors, including garbled syntax, mangle pronouns, and other bizarre  constructions may be attributed to that software system.

## 2023-07-06 ENCOUNTER — LAB VISIT (OUTPATIENT)
Dept: LAB | Facility: HOSPITAL | Age: 38
End: 2023-07-06
Payer: MEDICAID

## 2023-07-06 DIAGNOSIS — E10.9 TYPE 1 DIABETES MELLITUS WITHOUT COMPLICATION: ICD-10-CM

## 2023-07-06 LAB
ANION GAP SERPL CALC-SCNC: 7 MMOL/L (ref 8–16)
BUN SERPL-MCNC: 14 MG/DL (ref 6–20)
CALCIUM SERPL-MCNC: 8.9 MG/DL (ref 8.7–10.5)
CHLORIDE SERPL-SCNC: 106 MMOL/L (ref 95–110)
CO2 SERPL-SCNC: 27 MMOL/L (ref 23–29)
CREAT SERPL-MCNC: 0.8 MG/DL (ref 0.5–1.4)
EST. GFR  (NO RACE VARIABLE): >60 ML/MIN/1.73 M^2
ESTIMATED AVG GLUCOSE: 174 MG/DL (ref 68–131)
GLUCOSE SERPL-MCNC: 154 MG/DL (ref 70–110)
HBA1C MFR BLD: 7.7 % (ref 4–5.6)
POTASSIUM SERPL-SCNC: 4.2 MMOL/L (ref 3.5–5.1)
SODIUM SERPL-SCNC: 140 MMOL/L (ref 136–145)

## 2023-07-06 PROCEDURE — 36415 COLL VENOUS BLD VENIPUNCTURE: CPT | Mod: PO | Performed by: NURSE PRACTITIONER

## 2023-07-06 PROCEDURE — 80048 BASIC METABOLIC PNL TOTAL CA: CPT | Performed by: NURSE PRACTITIONER

## 2023-07-06 PROCEDURE — 83036 HEMOGLOBIN GLYCOSYLATED A1C: CPT | Performed by: NURSE PRACTITIONER

## 2023-07-13 ENCOUNTER — OFFICE VISIT (OUTPATIENT)
Dept: DIABETES | Facility: CLINIC | Age: 38
End: 2023-07-13
Payer: MEDICAID

## 2023-07-13 ENCOUNTER — PATIENT MESSAGE (OUTPATIENT)
Dept: DIABETES | Facility: CLINIC | Age: 38
End: 2023-07-13

## 2023-07-13 ENCOUNTER — TELEPHONE (OUTPATIENT)
Dept: DIABETES | Facility: CLINIC | Age: 38
End: 2023-07-13

## 2023-07-13 DIAGNOSIS — Z96.41 INSULIN PUMP IN PLACE: ICD-10-CM

## 2023-07-13 DIAGNOSIS — E10.9 TYPE 1 DIABETES MELLITUS WITHOUT COMPLICATION: Primary | ICD-10-CM

## 2023-07-13 DIAGNOSIS — Z97.8 USES SELF-APPLIED CONTINUOUS GLUCOSE MONITORING DEVICE: ICD-10-CM

## 2023-07-13 PROCEDURE — 3051F HG A1C>EQUAL 7.0%<8.0%: CPT | Mod: CPTII,95,, | Performed by: NURSE PRACTITIONER

## 2023-07-13 PROCEDURE — 3061F NEG MICROALBUMINURIA REV: CPT | Mod: CPTII,95,, | Performed by: NURSE PRACTITIONER

## 2023-07-13 PROCEDURE — 99214 OFFICE O/P EST MOD 30 MIN: CPT | Mod: 95,,, | Performed by: NURSE PRACTITIONER

## 2023-07-13 PROCEDURE — 3066F PR DOCUMENTATION OF TREATMENT FOR NEPHROPATHY: ICD-10-PCS | Mod: CPTII,95,, | Performed by: NURSE PRACTITIONER

## 2023-07-13 PROCEDURE — 95251 CONT GLUC MNTR ANALYSIS I&R: CPT | Mod: S$PBB,NDTC,, | Performed by: NURSE PRACTITIONER

## 2023-07-13 PROCEDURE — 99214 PR OFFICE/OUTPT VISIT, EST, LEVL IV, 30-39 MIN: ICD-10-PCS | Mod: 95,,, | Performed by: NURSE PRACTITIONER

## 2023-07-13 PROCEDURE — 95251 PR GLUCOSE MONITOR, 72 HOUR, PHYS INTERP: ICD-10-PCS | Mod: S$PBB,NDTC,, | Performed by: NURSE PRACTITIONER

## 2023-07-13 PROCEDURE — 3051F PR MOST RECENT HEMOGLOBIN A1C LEVEL 7.0 - < 8.0%: ICD-10-PCS | Mod: CPTII,95,, | Performed by: NURSE PRACTITIONER

## 2023-07-13 PROCEDURE — 4010F ACE/ARB THERAPY RXD/TAKEN: CPT | Mod: CPTII,95,, | Performed by: NURSE PRACTITIONER

## 2023-07-13 PROCEDURE — 4010F PR ACE/ARB THEARPY RXD/TAKEN: ICD-10-PCS | Mod: CPTII,95,, | Performed by: NURSE PRACTITIONER

## 2023-07-13 PROCEDURE — 3066F NEPHROPATHY DOC TX: CPT | Mod: CPTII,95,, | Performed by: NURSE PRACTITIONER

## 2023-07-13 PROCEDURE — 3061F PR NEG MICROALBUMINURIA RESULT DOCUMENTED/REVIEW: ICD-10-PCS | Mod: CPTII,95,, | Performed by: NURSE PRACTITIONER

## 2023-07-13 NOTE — TELEPHONE ENCOUNTER
-Placed call to [employer] regarding Uprade to 780G from 770G informed that fax was not recieved and sent to Rep [facility] requested to have order refaxed confirmed reciept of fax will complete and return to [employer]     ---- Message from Valentina Bernardo sent at 7/13/2023 10:26 AM CDT -----  Contact: Honey/[employer]  Ese is calling in regards to the status of the prescription on Insulin pump and transmitter upgrade that was sent on 07/06.please call back at [phone] option [phone] and fax [phone]          Thanks  LUCAS

## 2023-07-13 NOTE — PATIENT INSTRUCTIONS
PATIENT INSTRUCTIONS     Fasting labs to be done 1 week prior to follow up appointment with me.    Diabetic Foot Exam deferred today due to virtual visit. Will plan to be done at next in-person visit.   Have your eye care provider fax last eye exam to our office.   Fax [phone].      Continue Medtronic 770G Insulin Pump - Will start order process to upgrade to 780G with Guardian Connect 4.     Basal Carb Ratio ISF Target/s AIT   0000 - 1.5  0800 - 1.5  2000 - 2.4  - Increase to 2.6 13 50 140 4 hrs     Be sure to enter glucose and carbs prior to meal for accurate correction boluses.    Continue Dexcom G6 CGM  Blood Sugar Goals:       Fastin-130.       1-2 hours after a meal: Less than 180.       U100 Pump Failure Plan:   We have decided to develop a plan in the event that your pump breaks or is nonfunctioning. After 4 hours without pump use, you should begin to consider putting your Pump Failure Back Up Plan into action especially if you foresee that you may not be able to use your pump for more than 20 hours. Since you are currently using short acting insulin (Humalog/Novolog/Admelog/Novolin R) in your pump, you will need access to your short acting insulin vial, syringes, and a back up long acting insulin in the event of a pump failure. I have sent a prescription for a long acting insulin to your pharmacy for use during your emergency pump failure plan. It is important that you keep both types of insulin/syringes with you so that you may have access to it at least 3 times daily if needed. This medication and syringes should be brought with you on overnight trips in the case of an emergency pump failure. This means making a plan to keeping your insulin and syringes at school, work, or other places you frequent. If needed, please reach out to my office about any letters you may need for permission to keep these items for emergency purposes. We will outline your plan for pump failure emergencies below, but  please remember to contact the insulin pump company (toll free number is printed on the label on the back of the insulin pump) and our office if you have a pump failure. When the insulin pump is restarted, do not restart basal rates until at least 22 hours after the last long acting insulin injection. You can set a 0% temporary basal setting that will last until this time and use your pump to bolus for meals and correction. If you need help with these feature, please call your insulin pump company tech support line or ask them in anticipation of this action.     Dosing:   If the insulin pump is non functional and discontinued for anticipated more than 20 hours, please give daily injections of:    Long Acting Insulin Dosing:   Lantus 40 units daily    Short Acting Insulin Dosing:   Novolog per IC of 13 (or 1 unit for every 13g of carbs) and ISF/CF of 50 (or 1 unit for every 50 pts above goal blood sugar of 140)  These settings should be in your [facility] [facility].

## 2023-07-13 NOTE — PROGRESS NOTES
The patient location is: Home  The chief complaint leading to consultation is: Diabetes    Visit type: audiovisual      Face to Face time with patient: 20  30 minutes of total time spent on the encounter, which includes face to face time and non-face to face time preparing to see the patient (eg, review of tests), Obtaining and/or reviewing separately obtained history, Documenting clinical information in the electronic or other health record, Independently interpreting results (not separately reported) and communicating results to the patient/family/caregiver, or Care coordination (not separately reported).  Each patient to whom he or she provides medical services by telemedicine is:  (1) informed of the relationship between the physician and patient and the respective role of any other health care provider with respect to management of the patient; and (2) notified that he or she may decline to receive medical services by telemedicine and may withdraw from such care at any time.    Notes:    Dominga Saldaña is a 37 y.o. female who presents for a follow up evaluation of Type 1 diabetes mellitus.     CHIEF COMPLAINT: Diabetes Consultation    PCP: Primary Doctor No      Initial visit with me - 3/16/2023    The patient was initially diagnosed with diabetes at age 14.   Patient previously followed by me at [facility], last visit 1/6/2023.     Previous failed treatments include:        Social Documentation:  Patient lives in [place] with .  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  Exercise: bowling     Diabetes related complications:   none.   denies Pancreatitis  denies Gastroparesis  denies DKA  denies Hx/family Hx of MEN2/MTC  denies Frequent UTIs/yeast infections     Cardiovascular Risk Factors: obesity (BMI >30 kg/m2).    Diabetes Medications               insulin lispro 100 unit/mL Crtg Inject 1 mL (100 Units total) into the skin once daily. USE WITH INPEN    insulin lispro 100 unit/mL  injection Inject 100 Units into the skin once daily. USE WITH INSULIN PUMP     Current monitoring regimen: continuous glucose monitor. Device data to be uploaded to media section. See AGP data below.     The patient's Dexcom CGM was downloaded and reviewed. For the past 14 days, patient average glucose was 194 mg/dL. She was above range 56% of the time, in range 44% of the time, and below range 0% of the time. The target range for this patient was 70 - 180 mg/dL. Overall, there was a pattern of post prandial hyperglycemia after supper.      Recent hypoglycemic episodes: No.   Patient compliant with glucose checks and medication administration? Yes    DIABETES MANAGEMENT STATUS  Statin: Not taking  ACE/ARB: Not taking  Screening or Prevention Patient's value Goal Complete/Controlled?   HgA1C Testing and Control   Lab Results   Component Value Date    HGBA1C 7.7 (H) 07/06/2023      Annually/Less than 8% Yes     Lipid profile : 04/20/2023 Annually No     LDL control Lab Results   Component Value Date    LDLCALC Invalid, Trig>400.0 04/20/2023    Annually/Less than 100 mg/dl  No     Nephropathy screening Lab Results   Component Value Date    LABMICR 6.0 04/20/2023     No results found for: PROTEINUA  No results found for: UTPCR   Annually No     Blood pressure BP Readings from Last 1 Encounters:   01/10/12 100/60    Less than 140/90 Yes     Dilated retinal exam Most Recent Eye Exam Date: Not Found Annually No     Foot exam   Most Recent Foot Exam Date: Not Found Annually No     Patient's medications, allergies, surgical, social and family histories were reviewed and updated as appropriate.   Hemoglobin A1C   Date Value Ref Range Status   07/06/2023 7.7 (H) 4.0 - 5.6 % Final     Comment:     ADA Screening Guidelines:  5.7-6.4%  Consistent with prediabetes  >or=6.5%  Consistent with diabetes    High levels of fetal hemoglobin interfere with the HbA1C  assay. Heterozygous hemoglobin variants (HbS, HgC, etc)do  not  significantly interfere with this assay.   However, presence of multiple variants may affect accuracy.     04/20/2023 7.4 (H) 4.0 - 5.6 % Final     Comment:     ADA Screening Guidelines:  5.7-6.4%  Consistent with prediabetes  >or=6.5%  Consistent with diabetes    High levels of fetal hemoglobin interfere with the HbA1C  assay. Heterozygous hemoglobin variants (HbS, HgC, etc)do  not significantly interfere with this assay.   However, presence of multiple variants may affect accuracy.     01/06/2023 7.2 (H) 4.7 - 5.6 % Final   09/29/2022 7.2 (H) 4.7 - 5.6 % Final   06/30/2022 7.1 (H) 4.7 - 5.6 % Final   05/12/2011 6.1 4.0 - 6.2 % Final       Review of Systems   Constitutional:  Negative for weight loss.   Eyes:  Negative for blurred vision and double vision.   Cardiovascular:  Negative for chest pain.   Gastrointestinal:  Negative for nausea and vomiting.   Genitourinary:  Negative for frequency.   Musculoskeletal:  Negative for falls.   Neurological:  Negative for dizziness and weakness.   Endo/Heme/Allergies:  Negative for polydipsia.   Psychiatric/Behavioral:  Negative for depression.    All other systems reviewed and are negative.     Physical Exam  Constitutional:       Appearance: Normal appearance.   HENT:      Head: Normocephalic and atraumatic.   Pulmonary:      Effort: No respiratory distress.   Musculoskeletal:      Cervical back: Normal range of motion.   Neurological:      Mental Status: She is alert and oriented to person, place, and time.   Psychiatric:         Mood and Affect: Mood normal.         Behavior: Behavior normal.      There were no vitals taken for this visit.  Wt Readings from Last 3 Encounters:   02/22/12 88 kg (194 lb)   01/10/12 86.6 kg (191 lb)   12/20/11 87.1 kg (192 lb)       ASSESSMENT    ICD-10-CM ICD-9-CM   1. Type 1 diabetes mellitus without complication  E10.9 250.01   2. INSULIN PUMP - MEDTRONIC 770G  Z96.41 V45.85   3. CGM - DEXCOM SHARING  Z97.8 V49.89         PLAN  Diagnoses  and all orders for this visit:    Type 1 diabetes mellitus without complication    INSULIN PUMP - MEDTRONIC 770G    CGM - DEXCOM SHARING      Reviewed pathophysiology of diabetes, complications related to the disease, importance of annual dilated eye exam and daily foot examination. Explained MOA, SE, dosage of medications. Written instructions given and reviewed with patient and patient verbalizes understanding.     3/16/2023: For the past 14 days, patient average glucose was 268 mg/dL, with standard deviation of 48. She was above range 96% of the time, in range 4% of the time, and below range 0% of the time. The target range for this patient was 70 - 180 mg/dL. Overall, there was a pattern of hyperglycemia. Patient started new 770G pump last week, states she is unsure is she put in the correct settings. Will set up data sharing with [employer] once patient has login info. Scheduled for new pump training with [employer] next week.     3/30/23 - average glucose was 199 mg/dL, with standard deviation of 31. She was above range 57% of the time, in range 43% of the time, and below range 0% of the time.    4/27/2023 - overall improvement in glucoses, avg 173, no lows. F/u 6 weeks.     6/15/2023 - patient had covid 2 weeks ago and glucoses were higher, also does not think she was putting in her corrections/boluses in correctly, not hitting deliver. Will upgrade to 780G with Guardian 4 CGM.     7/13/2023 - pump upgrade pending. Pattern of hyperglycemia after supper and overnight. Not entering glucose with carbs for correction dosing. Will increase 8pm basal to 2.4. f/u 6 weeks.      PATIENT INSTRUCTIONS     Fasting labs to be done 1 week prior to follow up appointment with me.    Diabetic Foot Exam deferred today due to virtual visit. Will plan to be done at next in-person visit.   Have your eye care provider fax last eye exam to our office.   Fax [phone].      Continue Medtronic 770G Insulin Pump - Will start order  process to upgrade to 780G with Guardian Connect 4.     Basal Carb Ratio ISF Target/s AIT   0000 - 1.5  0800 - 1.5  2000 - 2.4  - Increase to 2.6 13 50 140 4 hrs     Be sure to enter glucose and carbs prior to meal for accurate correction boluses.    Continue Dexcom G6 CGM  Blood Sugar Goals:       Fastin-130.       1-2 hours after a meal: Less than 180.       U100 Pump Failure Plan:   We have decided to develop a plan in the event that your pump breaks or is nonfunctioning. After 4 hours without pump use, you should begin to consider putting your Pump Failure Back Up Plan into action especially if you foresee that you may not be able to use your pump for more than 20 hours. Since you are currently using short acting insulin (Humalog/Novolog/Admelog/Novolin R) in your pump, you will need access to your short acting insulin vial, syringes, and a back up long acting insulin in the event of a pump failure. I have sent a prescription for a long acting insulin to your pharmacy for use during your emergency pump failure plan. It is important that you keep both types of insulin/syringes with you so that you may have access to it at least 3 times daily if needed. This medication and syringes should be brought with you on overnight trips in the case of an emergency pump failure. This means making a plan to keeping your insulin and syringes at school, work, or other places you frequent. If needed, please reach out to my office about any letters you may need for permission to keep these items for emergency purposes. We will outline your plan for pump failure emergencies below, but please remember to contact the insulin pump company (toll free number is printed on the label on the back of the insulin pump) and our office if you have a pump failure. When the insulin pump is restarted, do not restart basal rates until at least 22 hours after the last long acting insulin injection. You can set a 0% temporary basal setting  that will last until this time and use your pump to bolus for meals and correction. If you need help with these feature, please call your insulin pump company tech support line or ask them in anticipation of this action.     Dosing:   If the insulin pump is non functional and discontinued for anticipated more than 20 hours, please give daily injections of:    Long Acting Insulin Dosing:   Lantus 40 units daily    Short Acting Insulin Dosing:   Novolog per IC of 13 (or 1 unit for every 13g of carbs) and ISF/CF of 50 (or 1 unit for every 50 pts above goal blood sugar of 140)  These settings should be in your [employer] [facility].     Follow up in about 6 weeks (around 8/24/2023) for VIRTUAL, DEXCOM - [employer], [employer].    Portions of this note were prepared with [employer] Naturally Speaking voice recognition transcription software. Grammatical errors, including garbled syntax, mangle pronouns, and other bizarre constructions may be attributed to that software system.

## 2023-07-14 ENCOUNTER — PATIENT MESSAGE (OUTPATIENT)
Dept: DIABETES | Facility: CLINIC | Age: 38
End: 2023-07-14

## 2023-07-25 ENCOUNTER — TELEPHONE (OUTPATIENT)
Dept: DIABETES | Facility: CLINIC | Age: 38
End: 2023-07-25

## 2023-07-25 NOTE — TELEPHONE ENCOUNTER
Faxed chart note and Dexcom and insulin pump order to [facility]. Form scanned to [employer]

## 2023-08-09 ENCOUNTER — PATIENT MESSAGE (OUTPATIENT)
Dept: DIABETES | Facility: CLINIC | Age: 38
End: 2023-08-09

## 2023-08-10 ENCOUNTER — TELEPHONE (OUTPATIENT)
Dept: DIABETES | Facility: CLINIC | Age: 38
End: 2023-08-10

## 2023-08-10 NOTE — TELEPHONE ENCOUNTER
----- Message from MILENA Brumfield sent at 8/10/2023 11:45 AM CDT -----  Please send updated chart notes to [facility]. Patient states [facility] has updated order, but no notes.

## 2023-08-23 ENCOUNTER — OFFICE VISIT (OUTPATIENT)
Dept: DIABETES | Facility: CLINIC | Age: 38
End: 2023-08-23
Payer: MEDICAID

## 2023-08-23 DIAGNOSIS — Z97.8 USES SELF-APPLIED CONTINUOUS GLUCOSE MONITORING DEVICE: ICD-10-CM

## 2023-08-23 DIAGNOSIS — E10.9 TYPE 1 DIABETES MELLITUS WITHOUT COMPLICATION: Primary | ICD-10-CM

## 2023-08-23 PROCEDURE — 95251 CONT GLUC MNTR ANALYSIS I&R: CPT | Mod: NDTC,,, | Performed by: NURSE PRACTITIONER

## 2023-08-23 PROCEDURE — 3066F NEPHROPATHY DOC TX: CPT | Mod: CPTII,95,, | Performed by: NURSE PRACTITIONER

## 2023-08-23 PROCEDURE — 95251 PR GLUCOSE MONITOR, 72 HOUR, PHYS INTERP: ICD-10-PCS | Mod: NDTC,,, | Performed by: NURSE PRACTITIONER

## 2023-08-23 PROCEDURE — 3061F PR NEG MICROALBUMINURIA RESULT DOCUMENTED/REVIEW: ICD-10-PCS | Mod: CPTII,95,, | Performed by: NURSE PRACTITIONER

## 2023-08-23 PROCEDURE — 4010F ACE/ARB THERAPY RXD/TAKEN: CPT | Mod: CPTII,95,, | Performed by: NURSE PRACTITIONER

## 2023-08-23 PROCEDURE — 3051F PR MOST RECENT HEMOGLOBIN A1C LEVEL 7.0 - < 8.0%: ICD-10-PCS | Mod: CPTII,95,, | Performed by: NURSE PRACTITIONER

## 2023-08-23 PROCEDURE — 99214 OFFICE O/P EST MOD 30 MIN: CPT | Mod: 95,,, | Performed by: NURSE PRACTITIONER

## 2023-08-23 PROCEDURE — 4010F PR ACE/ARB THEARPY RXD/TAKEN: ICD-10-PCS | Mod: CPTII,95,, | Performed by: NURSE PRACTITIONER

## 2023-08-23 PROCEDURE — 3051F HG A1C>EQUAL 7.0%<8.0%: CPT | Mod: CPTII,95,, | Performed by: NURSE PRACTITIONER

## 2023-08-23 PROCEDURE — 3066F PR DOCUMENTATION OF TREATMENT FOR NEPHROPATHY: ICD-10-PCS | Mod: CPTII,95,, | Performed by: NURSE PRACTITIONER

## 2023-08-23 PROCEDURE — 3061F NEG MICROALBUMINURIA REV: CPT | Mod: CPTII,95,, | Performed by: NURSE PRACTITIONER

## 2023-08-23 PROCEDURE — 99214 PR OFFICE/OUTPT VISIT, EST, LEVL IV, 30-39 MIN: ICD-10-PCS | Mod: 95,,, | Performed by: NURSE PRACTITIONER

## 2023-08-23 NOTE — PROGRESS NOTES
The patient location is: Home  The chief complaint leading to consultation is: Diabetes    Visit type: audiovisual      Face to Face time with patient: 20  30 minutes of total time spent on the encounter, which includes face to face time and non-face to face time preparing to see the patient (eg, review of tests), Obtaining and/or reviewing separately obtained history, Documenting clinical information in the electronic or other health record, Independently interpreting results (not separately reported) and communicating results to the patient/family/caregiver, or Care coordination (not separately reported).  Each patient to whom he or she provides medical services by telemedicine is:  (1) informed of the relationship between the physician and patient and the respective role of any other health care provider with respect to management of the patient; and (2) notified that he or she may decline to receive medical services by telemedicine and may withdraw from such care at any time.    Notes:    Dominga Saldaña is a 37 y.o. female who presents for a follow up evaluation of Type 1 diabetes mellitus.     CHIEF COMPLAINT: Diabetes Consultation    PCP: No, Primary Doctor      Initial visit with me - 3/16/2023    The patient was initially diagnosed with diabetes at age 14.   Patient previously followed by me at [facility], last visit 1/6/2023.     Previous failed treatments include:        Social Documentation:  Patient lives in [place] with .  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  Exercise: bowling     Diabetes related complications:   none.   denies Pancreatitis  denies Gastroparesis  denies DKA  denies Hx/family Hx of MEN2/MTC  denies Frequent UTIs/yeast infections     Cardiovascular Risk Factors: obesity (BMI >30 kg/m2).    Diabetes Medications               insulin lispro 100 unit/mL Crtg Inject 1 mL (100 Units total) into the skin once daily. USE WITH INPEN    insulin lispro 100 unit/mL  "injection Inject 100 Units into the skin once daily. USE WITH INSULIN PUMP     Current monitoring regimen: continuous glucose monitor. Device data to be uploaded to media section. See AGP data below.       The patient's Dexcom CGM was downloaded and reviewed. For the past 14 days, patient average glucose was 166 mg/dL. She was above range 38% of the time, in range 62% of the time, and below range 0% of the time. The target range for this patient was 70 - 180 mg/dL. Overall, there was a pattern of fasting euglycemia, post prandial hyperglycemia, improvement.     Recent hypoglycemic episodes: No.   Patient compliant with glucose checks and medication administration? Yes    DIABETES MANAGEMENT STATUS  Statin: Not taking  ACE/ARB: Not taking  Screening or Prevention Patient's value Goal Complete/Controlled?   HgA1C Testing and Control   Lab Results   Component Value Date    HGBA1C 7.7 (H) 07/06/2023      Annually/Less than 8% Yes     Lipid profile : 04/20/2023 Annually No     LDL control Lab Results   Component Value Date    LDLCALC Invalid, Trig>400.0 04/20/2023    Annually/Less than 100 mg/dl  No     Nephropathy screening Lab Results   Component Value Date    LABMICR 6.0 04/20/2023     No results found for: "PROTEINUA"  No results found for: "UTPCR"   Annually No     Blood pressure BP Readings from Last 1 Encounters:   01/10/12 100/60    Less than 140/90 Yes     Dilated retinal exam Most Recent Eye Exam Date: Not Found Annually No     Foot exam   Most Recent Foot Exam Date: Not Found Annually No     Patient's medications, allergies, surgical, social and family histories were reviewed and updated as appropriate.   Hemoglobin A1C   Date Value Ref Range Status   07/06/2023 7.7 (H) 4.0 - 5.6 % Final     Comment:     ADA Screening Guidelines:  5.7-6.4%  Consistent with prediabetes  >or=6.5%  Consistent with diabetes    High levels of fetal hemoglobin interfere with the HbA1C  assay. Heterozygous hemoglobin variants (HbS, " HgC, etc)do  not significantly interfere with this assay.   However, presence of multiple variants may affect accuracy.     04/20/2023 7.4 (H) 4.0 - 5.6 % Final     Comment:     ADA Screening Guidelines:  5.7-6.4%  Consistent with prediabetes  >or=6.5%  Consistent with diabetes    High levels of fetal hemoglobin interfere with the HbA1C  assay. Heterozygous hemoglobin variants (HbS, HgC, etc)do  not significantly interfere with this assay.   However, presence of multiple variants may affect accuracy.     01/06/2023 7.2 (H) 4.7 - 5.6 % Final   09/29/2022 7.2 (H) 4.7 - 5.6 % Final   06/30/2022 7.1 (H) 4.7 - 5.6 % Final   05/12/2011 6.1 4.0 - 6.2 % Final       Review of Systems   Constitutional:  Negative for weight loss.   Eyes:  Negative for blurred vision and double vision.   Cardiovascular:  Negative for chest pain.   Gastrointestinal:  Negative for nausea and vomiting.   Genitourinary:  Negative for frequency.   Musculoskeletal:  Negative for falls.   Neurological:  Negative for dizziness and weakness.   Endo/Heme/Allergies:  Negative for polydipsia.   Psychiatric/Behavioral:  Negative for depression.    All other systems reviewed and are negative.       Physical Exam  Constitutional:       Appearance: Normal appearance.   HENT:      Head: Normocephalic and atraumatic.   Pulmonary:      Effort: No respiratory distress.   Musculoskeletal:      Cervical back: Normal range of motion.   Neurological:      Mental Status: She is alert and oriented to person, place, and time.   Psychiatric:         Mood and Affect: Mood normal.         Behavior: Behavior normal.        There were no vitals taken for this visit.  Wt Readings from Last 3 Encounters:   02/22/12 88 kg (194 lb)   01/10/12 86.6 kg (191 lb)   12/20/11 87.1 kg (192 lb)       ASSESSMENT    ICD-10-CM ICD-9-CM   1. Type 1 diabetes mellitus without complication  E10.9 250.01   2. CGM - DEXCOM SHARING  Z97.8 V49.89           PLAN  Diagnoses and all orders for this  visit:    Type 1 diabetes mellitus without complication    CGM - DEXCOM SHARING        Reviewed pathophysiology of diabetes, complications related to the disease, importance of annual dilated eye exam and daily foot examination. Explained MOA, SE, dosage of medications. Written instructions given and reviewed with patient and patient verbalizes understanding.     3/16/2023: For the past 14 days, patient average glucose was 268 mg/dL, with standard deviation of 48. She was above range 96% of the time, in range 4% of the time, and below range 0% of the time. The target range for this patient was 70 - 180 mg/dL. Overall, there was a pattern of hyperglycemia. Patient started new 770G pump last week, states she is unsure is she put in the correct settings. Will set up data sharing with [employer] once patient has login info. Scheduled for new pump training with [employer] next week.     3/30/23 - average glucose was 199 mg/dL, with standard deviation of 31. She was above range 57% of the time, in range 43% of the time, and below range 0% of the time.    4/27/2023 - overall improvement in glucoses, avg 173, no lows. F/u 6 weeks.     6/15/2023 - patient had covid 2 weeks ago and glucoses were higher, also does not think she was putting in her corrections/boluses in correctly, not hitting deliver. Will upgrade to 780G with Guardian 4 CGM.     7/13/2023 - pump upgrade pending. Pattern of hyperglycemia after supper and overnight. Not entering glucose with carbs for correction dosing. Will increase 8pm basal to 2.4. f/u 6 weeks.     8/23/2023 - overall improvement. Will receive Guardian and pump supplies today, has been assigned for upgrade training. She will call after work today and send message when complete.      PATIENT INSTRUCTIONS     Diabetic Foot Exam deferred today due to virtual visit. Will plan to be done at next in-person visit.   Have your eye care provider fax last eye exam to our office.   Fax [phone].       Continue Medtronic 770G Insulin Pump    Send message in [facility] portal once 780G training is complete.    Basal Carb Ratio ISF Target/s AIT   0000 - 1.5  0800 - 1.5  2000 - 2.6 13 50 140 4 hrs     Be sure to enter glucose and carbs prior to meal for accurate correction boluses.    Continue Dexcom G6 CGM  Blood Sugar Goals:       Fastin-130.       1-2 hours after a meal: Less than 180.     U100 Pump Failure Plan:   We have decided to develop a plan in the event that your pump breaks or is nonfunctioning. After 4 hours without pump use, you should begin to consider putting your Pump Failure Back Up Plan into action especially if you foresee that you may not be able to use your pump for more than 20 hours. Since you are currently using short acting insulin (Humalog/Novolog/Admelog/Novolin R) in your pump, you will need access to your short acting insulin vial, syringes, and a back up long acting insulin in the event of a pump failure. I have sent a prescription for a long acting insulin to your pharmacy for use during your emergency pump failure plan. It is important that you keep both types of insulin/syringes with you so that you may have access to it at least 3 times daily if needed. This medication and syringes should be brought with you on overnight trips in the case of an emergency pump failure. This means making a plan to keeping your insulin and syringes at school, work, or other places you frequent. If needed, please reach out to my office about any letters you may need for permission to keep these items for emergency purposes. We will outline your plan for pump failure emergencies below, but please remember to contact the insulin pump company (toll free number is printed on the label on the back of the insulin pump) and our office if you have a pump failure. When the insulin pump is restarted, do not restart basal rates until at least 22 hours after the last long acting insulin injection. You can  set a 0% temporary basal setting that will last until this time and use your pump to bolus for meals and correction. If you need help with these feature, please call your insulin pump company tech support line or ask them in anticipation of this action.     Dosing:   If the insulin pump is non functional and discontinued for anticipated more than 20 hours, please give daily injections of:    Long Acting Insulin Dosing:   Lantus 40 units daily    Short Acting Insulin Dosing:   Novolog per IC of 13 (or 1 unit for every 13g of carbs) and ISF/CF of 50 (or 1 unit for every 50 pts above goal blood sugar of 140)  These settings should be in your [facility].     Follow up in about 4 weeks (around 9/20/2023) for VIRTUAL, DEXCOM - [facility], [employer].    Portions of this note were prepared with [employer] voice recognition transcription software. Grammatical errors, including garbled syntax, mangle pronouns, and other bizarre constructions may be attributed to that software system.

## 2023-08-23 NOTE — PATIENT INSTRUCTIONS
PATIENT INSTRUCTIONS     Diabetic Foot Exam deferred today due to virtual visit. Will plan to be done at next in-person visit.   Have your eye care provider fax last eye exam to our office.   Fax [phone].      Continue Medtronic 770G Insulin Pump    Send message in [facility] portal once 780G training is complete.    Basal Carb Ratio ISF Target/s AIT   0000 - 1.5  0800 - 1.5  2000 - 2.6 13 50 140 4 hrs     Be sure to enter glucose and carbs prior to meal for accurate correction boluses.    Continue Dexcom G6 CGM  Blood Sugar Goals:       Fastin-130.       1-2 hours after a meal: Less than 180.     U100 Pump Failure Plan:   We have decided to develop a plan in the event that your pump breaks or is nonfunctioning. After 4 hours without pump use, you should begin to consider putting your Pump Failure Back Up Plan into action especially if you foresee that you may not be able to use your pump for more than 20 hours. Since you are currently using short acting insulin (Humalog/Novolog/Admelog/Novolin R) in your pump, you will need access to your short acting insulin vial, syringes, and a back up long acting insulin in the event of a pump failure. I have sent a prescription for a long acting insulin to your pharmacy for use during your emergency pump failure plan. It is important that you keep both types of insulin/syringes with you so that you may have access to it at least 3 times daily if needed. This medication and syringes should be brought with you on overnight trips in the case of an emergency pump failure. This means making a plan to keeping your insulin and syringes at school, work, or other places you frequent. If needed, please reach out to my office about any letters you may need for permission to keep these items for emergency purposes. We will outline your plan for pump failure emergencies below, but please remember to contact the insulin pump company (toll free number is printed on the label on the  back of the insulin pump) and our office if you have a pump failure. When the insulin pump is restarted, do not restart basal rates until at least 22 hours after the last long acting insulin injection. You can set a 0% temporary basal setting that will last until this time and use your pump to bolus for meals and correction. If you need help with these feature, please call your insulin pump company tech support line or ask them in anticipation of this action.     Dosing:   If the insulin pump is non functional and discontinued for anticipated more than 20 hours, please give daily injections of:    Long Acting Insulin Dosing:   Lantus 40 units daily    Short Acting Insulin Dosing:   Novolog per IC of 13 (or 1 unit for every 13g of carbs) and ISF/CF of 50 (or 1 unit for every 50 pts above goal blood sugar of 140)  These settings should be in your Inpen Louie.

## 2023-09-21 ENCOUNTER — PATIENT MESSAGE (OUTPATIENT)
Dept: DIABETES | Facility: CLINIC | Age: 38
End: 2023-09-21

## 2023-10-05 ENCOUNTER — OFFICE VISIT (OUTPATIENT)
Dept: DIABETES | Facility: CLINIC | Age: 38
End: 2023-10-05
Payer: MEDICAID

## 2023-10-05 VITALS
DIASTOLIC BLOOD PRESSURE: 70 MMHG | WEIGHT: 197.81 LBS | HEIGHT: 60 IN | SYSTOLIC BLOOD PRESSURE: 106 MMHG | BODY MASS INDEX: 38.84 KG/M2

## 2023-10-05 DIAGNOSIS — E10.9 TYPE 1 DIABETES MELLITUS WITHOUT COMPLICATION: Primary | ICD-10-CM

## 2023-10-05 DIAGNOSIS — Z96.41 INSULIN PUMP IN PLACE: ICD-10-CM

## 2023-10-05 DIAGNOSIS — Z97.8 USES SELF-APPLIED CONTINUOUS GLUCOSE MONITORING DEVICE: ICD-10-CM

## 2023-10-05 LAB — GLUCOSE SERPL-MCNC: 93 MG/DL (ref 70–110)

## 2023-10-05 PROCEDURE — 99999PBSHW POCT GLUCOSE, HAND-HELD DEVICE: Mod: PBBFAC,,,

## 2023-10-05 PROCEDURE — 4010F ACE/ARB THERAPY RXD/TAKEN: CPT | Mod: CPTII,,, | Performed by: NURSE PRACTITIONER

## 2023-10-05 PROCEDURE — 99999 PR PBB SHADOW E&M-EST. PATIENT-LVL III: CPT | Mod: PBBFAC,,, | Performed by: NURSE PRACTITIONER

## 2023-10-05 PROCEDURE — 3074F SYST BP LT 130 MM HG: CPT | Mod: CPTII,,, | Performed by: NURSE PRACTITIONER

## 2023-10-05 PROCEDURE — 99999PBSHW POCT GLUCOSE, HAND-HELD DEVICE: ICD-10-PCS | Mod: PBBFAC,,,

## 2023-10-05 PROCEDURE — 3061F NEG MICROALBUMINURIA REV: CPT | Mod: CPTII,,, | Performed by: NURSE PRACTITIONER

## 2023-10-05 PROCEDURE — 99214 PR OFFICE/OUTPT VISIT, EST, LEVL IV, 30-39 MIN: ICD-10-PCS | Mod: S$PBB,,, | Performed by: NURSE PRACTITIONER

## 2023-10-05 PROCEDURE — 99214 OFFICE O/P EST MOD 30 MIN: CPT | Mod: S$PBB,,, | Performed by: NURSE PRACTITIONER

## 2023-10-05 PROCEDURE — 3078F PR MOST RECENT DIASTOLIC BLOOD PRESSURE < 80 MM HG: ICD-10-PCS | Mod: CPTII,,, | Performed by: NURSE PRACTITIONER

## 2023-10-05 PROCEDURE — 1159F PR MEDICATION LIST DOCUMENTED IN MEDICAL RECORD: ICD-10-PCS | Mod: CPTII,,, | Performed by: NURSE PRACTITIONER

## 2023-10-05 PROCEDURE — 3051F PR MOST RECENT HEMOGLOBIN A1C LEVEL 7.0 - < 8.0%: ICD-10-PCS | Mod: CPTII,,, | Performed by: NURSE PRACTITIONER

## 2023-10-05 PROCEDURE — 99213 OFFICE O/P EST LOW 20 MIN: CPT | Mod: PBBFAC,PO | Performed by: NURSE PRACTITIONER

## 2023-10-05 PROCEDURE — 3066F PR DOCUMENTATION OF TREATMENT FOR NEPHROPATHY: ICD-10-PCS | Mod: CPTII,,, | Performed by: NURSE PRACTITIONER

## 2023-10-05 PROCEDURE — 99999 PR PBB SHADOW E&M-EST. PATIENT-LVL III: ICD-10-PCS | Mod: PBBFAC,,, | Performed by: NURSE PRACTITIONER

## 2023-10-05 PROCEDURE — 3051F HG A1C>EQUAL 7.0%<8.0%: CPT | Mod: CPTII,,, | Performed by: NURSE PRACTITIONER

## 2023-10-05 PROCEDURE — 3008F BODY MASS INDEX DOCD: CPT | Mod: CPTII,,, | Performed by: NURSE PRACTITIONER

## 2023-10-05 PROCEDURE — 3066F NEPHROPATHY DOC TX: CPT | Mod: CPTII,,, | Performed by: NURSE PRACTITIONER

## 2023-10-05 PROCEDURE — 82962 GLUCOSE BLOOD TEST: CPT | Mod: PBBFAC,PO | Performed by: NURSE PRACTITIONER

## 2023-10-05 PROCEDURE — 4010F PR ACE/ARB THEARPY RXD/TAKEN: ICD-10-PCS | Mod: CPTII,,, | Performed by: NURSE PRACTITIONER

## 2023-10-05 PROCEDURE — 3008F PR BODY MASS INDEX (BMI) DOCUMENTED: ICD-10-PCS | Mod: CPTII,,, | Performed by: NURSE PRACTITIONER

## 2023-10-05 PROCEDURE — 3061F PR NEG MICROALBUMINURIA RESULT DOCUMENTED/REVIEW: ICD-10-PCS | Mod: CPTII,,, | Performed by: NURSE PRACTITIONER

## 2023-10-05 PROCEDURE — 3078F DIAST BP <80 MM HG: CPT | Mod: CPTII,,, | Performed by: NURSE PRACTITIONER

## 2023-10-05 PROCEDURE — 3074F PR MOST RECENT SYSTOLIC BLOOD PRESSURE < 130 MM HG: ICD-10-PCS | Mod: CPTII,,, | Performed by: NURSE PRACTITIONER

## 2023-10-05 PROCEDURE — 1159F MED LIST DOCD IN RCRD: CPT | Mod: CPTII,,, | Performed by: NURSE PRACTITIONER

## 2023-10-05 RX ORDER — SERDEXMETHYLPHENIDATE AND DEXMETHYLPHENIDATE 7.8; 39.2 MG/1; MG/1
1 CAPSULE ORAL EVERY MORNING
COMMUNITY
Start: 2023-09-08

## 2023-10-05 RX ORDER — GABAPENTIN 300 MG/1
300 CAPSULE ORAL NIGHTLY
COMMUNITY
Start: 2023-02-27 | End: 2023-10-05 | Stop reason: SDUPTHER

## 2023-10-05 RX ORDER — GABAPENTIN 300 MG/1
300 CAPSULE ORAL NIGHTLY
Qty: 90 CAPSULE | Refills: 3 | Status: SHIPPED | OUTPATIENT
Start: 2023-10-05 | End: 2024-10-04

## 2023-10-05 RX ORDER — ATORVASTATIN CALCIUM 40 MG/1
40 TABLET, FILM COATED ORAL
COMMUNITY
Start: 2023-10-04

## 2023-10-05 RX ORDER — ONDANSETRON 4 MG/1
4 TABLET, ORALLY DISINTEGRATING ORAL EVERY 8 HOURS PRN
COMMUNITY
Start: 2022-11-01

## 2023-10-05 RX ORDER — LISINOPRIL 5 MG/1
5 TABLET ORAL
COMMUNITY

## 2023-10-05 RX ORDER — FLUOXETINE HYDROCHLORIDE 40 MG/1
40 CAPSULE ORAL
COMMUNITY
Start: 2023-08-19

## 2023-10-05 RX ORDER — FERROUS SULFATE 325(65) MG
TABLET ORAL 2 TIMES DAILY
COMMUNITY
Start: 2023-05-31

## 2023-10-05 RX ORDER — PANTOPRAZOLE SODIUM 40 MG/1
40 TABLET, DELAYED RELEASE ORAL
COMMUNITY
Start: 2023-10-04

## 2023-10-05 RX ORDER — DROSPIRENONE 4 MG/1
1 TABLET, FILM COATED ORAL
COMMUNITY
Start: 2023-08-21

## 2023-10-05 RX ORDER — PEN NEEDLE, DIABETIC 31 GX5/16"
NEEDLE, DISPOSABLE MISCELLANEOUS
COMMUNITY
Start: 2023-04-22

## 2023-10-05 RX ORDER — CHOLECALCIFEROL (VITAMIN D3) 125 MCG
5000 CAPSULE ORAL 2 TIMES DAILY
COMMUNITY
Start: 2023-08-21

## 2023-10-05 NOTE — PROGRESS NOTES
"  Notes:    Dominga Saldaña is a 37 y.o. female who presents for a follow up evaluation of Type 1 diabetes mellitus.     CHIEF COMPLAINT: Diabetes Consultation    PCP: No, Primary Doctor      Initial visit with me - 3/16/2023    The patient was initially diagnosed with diabetes at age 14.   Patient previously followed by me at [facility], last visit 1/6/2023.     Previous failed treatments include:        Social Documentation:  Patient lives in [place] with .  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  Exercise: bowling     Diabetes related complications:   none.   denies Pancreatitis  denies Gastroparesis  denies DKA  denies Hx/family Hx of MEN2/MTC  denies Frequent UTIs/yeast infections     Cardiovascular Risk Factors: obesity (BMI >30 kg/m2).    Diabetes Medications               insulin lispro 100 unit/mL Crtg Inject 1 mL (100 Units total) into the skin once daily. USE WITH INPEN    insulin lispro 100 unit/mL injection Inject 100 Units into the skin once daily. USE WITH INSULIN PUMP     Current monitoring regimen:   Dexcom G7 Sharing - not currently wearing.      Recent hypoglycemic episodes: No.   Patient compliant with glucose checks and medication administration? Yes    DIABETES MANAGEMENT STATUS  Statin: Not taking  ACE/ARB: Not taking  Screening or Prevention Patient's value Goal Complete/Controlled?   HgA1C Testing and Control   Lab Results   Component Value Date    HGBA1C 7.7 (H) 07/06/2023      Annually/Less than 8% Yes     Lipid profile : 04/20/2023 Annually No     LDL control Lab Results   Component Value Date    LDLCALC Invalid, Trig>400.0 04/20/2023    Annually/Less than 100 mg/dl  No     Nephropathy screening Lab Results   Component Value Date    LABMICR 6.0 04/20/2023     No results found for: "PROTEINUA"  No results found for: "UTPCR"   Annually No     Blood pressure BP Readings from Last 1 Encounters:   10/05/23 106/70    Less than 140/90 Yes     Dilated retinal exam Most " Recent Eye Exam Date: Not Found Annually No     Foot exam   : 10/05/2023 Annually No     Patient's medications, allergies, surgical, social and family histories were reviewed and updated as appropriate.   Hemoglobin A1C   Date Value Ref Range Status   07/06/2023 7.7 (H) 4.0 - 5.6 % Final     Comment:     ADA Screening Guidelines:  5.7-6.4%  Consistent with prediabetes  >or=6.5%  Consistent with diabetes    High levels of fetal hemoglobin interfere with the HbA1C  assay. Heterozygous hemoglobin variants (HbS, HgC, etc)do  not significantly interfere with this assay.   However, presence of multiple variants may affect accuracy.     04/20/2023 7.4 (H) 4.0 - 5.6 % Final     Comment:     ADA Screening Guidelines:  5.7-6.4%  Consistent with prediabetes  >or=6.5%  Consistent with diabetes    High levels of fetal hemoglobin interfere with the HbA1C  assay. Heterozygous hemoglobin variants (HbS, HgC, etc)do  not significantly interfere with this assay.   However, presence of multiple variants may affect accuracy.     01/06/2023 7.2 (H) 4.7 - 5.6 % Final   09/29/2022 7.2 (H) 4.7 - 5.6 % Final   06/30/2022 7.1 (H) 4.7 - 5.6 % Final   05/12/2011 6.1 4.0 - 6.2 % Final       Review of Systems   Constitutional:  Negative for weight loss.   Eyes:  Negative for blurred vision and double vision.   Cardiovascular:  Negative for chest pain.   Gastrointestinal:  Negative for nausea and vomiting.   Genitourinary:  Negative for frequency.   Musculoskeletal:  Negative for falls.   Neurological:  Negative for dizziness and weakness.   Endo/Heme/Allergies:  Negative for polydipsia.   Psychiatric/Behavioral:  Negative for depression.    All other systems reviewed and are negative.       Physical Exam  Constitutional:       Appearance: Normal appearance.   HENT:      Head: Normocephalic and atraumatic.   Cardiovascular:      Pulses:           Dorsalis pedis pulses are 2+ on the right side and 2+ on the left side.        Posterior tibial pulses  are 2+ on the right side and 2+ on the left side.   Pulmonary:      Effort: No respiratory distress.   Musculoskeletal:      Cervical back: Normal range of motion.      Right foot: No deformity.   Feet:      Right foot:      Protective Sensation: 5 sites tested.  5 sites sensed.      Skin integrity: Skin integrity normal.      Toenail Condition: Right toenails are normal.      Left foot:      Protective Sensation: 5 sites tested.  5 sites sensed.      Skin integrity: Skin integrity normal.      Toenail Condition: Left toenails are normal.   Neurological:      Mental Status: She is alert and oriented to person, place, and time.   Psychiatric:         Mood and Affect: Mood normal.         Behavior: Behavior normal.        Blood pressure 106/70, height 5' (1.524 m), weight 89.7 kg (197 lb 12.8 oz).  Wt Readings from Last 3 Encounters:   10/05/23 89.7 kg (197 lb 12.8 oz)   02/22/12 88 kg (194 lb)   01/10/12 86.6 kg (191 lb)       ASSESSMENT    ICD-10-CM ICD-9-CM   1. Type 1 diabetes mellitus without complication  E10.9 250.01   2. INSULIN PUMP - MEDTRONIC 770G  Z96.41 V45.85   3. CGM - DEXCOM SHARING  Z97.8 V49.89           PLAN  Diagnoses and all orders for this visit:    Type 1 diabetes mellitus without complication  -     POCT Glucose, Hand-Held Device  -     gabapentin (NEURONTIN) 300 MG capsule; Take 1 capsule (300 mg total) by mouth every evening.    INSULIN PUMP - MEDTRONIC 770G    CGM - DEXCOM SHARING        Reviewed pathophysiology of diabetes, complications related to the disease, importance of annual dilated eye exam and daily foot examination. Explained MOA, SE, dosage of medications. Written instructions given and reviewed with patient and patient verbalizes understanding.     3/16/2023: For the past 14 days, patient average glucose was 268 mg/dL, with standard deviation of 48. She was above range 96% of the time, in range 4% of the time, and below range 0% of the time. The target range for this patient was  70 - 180 mg/dL. Overall, there was a pattern of hyperglycemia. Patient started new 770G pump last week, states she is unsure is she put in the correct settings. Will set up data sharing with [employer] once patient has login info. Scheduled for new pump training with Medtronic next week.     3/30/23 - average glucose was 199 mg/dL, with standard deviation of 31. She was above range 57% of the time, in range 43% of the time, and below range 0% of the time.    2023 - overall improvement in glucoses, avg 173, no lows. F/u 6 weeks.     6/15/2023 - patient had covid 2 weeks ago and glucoses were higher, also does not think she was putting in her corrections/boluses in correctly, not hitting deliver. Will upgrade to 780G with Guardian 4 CGM.     2023 - pump upgrade pending. Pattern of hyperglycemia after supper and overnight. Not entering glucose with carbs for correction dosing. Will increase 8pm basal to 2.4. f/u 6 weeks.     2023 - overall improvement. Will receive Guardian and pump supplies today, has been assigned for upgrade training. She will call after work today and send message when complete.      10/5/2023 - has completed 780 training, but never received the Guardian sensors. A1c done yesterday at PCP, 7.3.    PATIENT INSTRUCTIONS     Will request recent labs from PCP at [facility] in [place].     Have your eye care provider fax last eye exam to our office.   Fax [phone].      Continue Medtronic 770G Insulin Pump    Send message in [facility] portal once 780G training is complete.    Basal Carb Ratio ISF Target/s AIT   0000 - 1.5  0800 - 1.5  2000 - 2.6 13 50 140 4 hrs     Be sure to enter glucose and carbs prior to meal for accurate correction boluses.    Continue Dexcom G6 CGM  Blood Sugar Goals:       Fastin-130.       1-2 hours after a meal: Less than 180.     U100 Pump Failure Plan:   We have decided to develop a plan in the event that your pump breaks or is nonfunctioning. After 4 hours  without pump use, you should begin to consider putting your Pump Failure Back Up Plan into action especially if you foresee that you may not be able to use your pump for more than 20 hours. Since you are currently using short acting insulin (Humalog/Novolog/Admelog/Novolin R) in your pump, you will need access to your short acting insulin vial, syringes, and a back up long acting insulin in the event of a pump failure. I have sent a prescription for a long acting insulin to your pharmacy for use during your emergency pump failure plan. It is important that you keep both types of insulin/syringes with you so that you may have access to it at least 3 times daily if needed. This medication and syringes should be brought with you on overnight trips in the case of an emergency pump failure. This means making a plan to keeping your insulin and syringes at school, work, or other places you frequent. If needed, please reach out to my office about any letters you may need for permission to keep these items for emergency purposes. We will outline your plan for pump failure emergencies below, but please remember to contact the insulin pump company (toll free number is printed on the label on the back of the insulin pump) and our office if you have a pump failure. When the insulin pump is restarted, do not restart basal rates until at least 22 hours after the last long acting insulin injection. You can set a 0% temporary basal setting that will last until this time and use your pump to bolus for meals and correction. If you need help with these feature, please call your insulin pump company tech support line or ask them in anticipation of this action.     Dosing:   If the insulin pump is non functional and discontinued for anticipated more than 20 hours, please give daily injections of:    Long Acting Insulin Dosing:   Lantus 40 units daily    Short Acting Insulin Dosing:   Novolog per IC of 13 (or 1 unit for every 13g of  carbs) and ISF/CF of 50 (or 1 unit for every 50 pts above goal blood sugar of 140)  These settings should be in your Inpen Louie.     Follow up in about 2 months (around 12/5/2023) for In-Person, Medtronic, Dexcom - Sharing.    Portions of this note were prepared with [employer] voice recognition transcription software. Grammatical errors, including garbled syntax, mangle pronouns, and other bizarre constructions may be attributed to that software system.

## 2023-10-05 NOTE — PATIENT INSTRUCTIONS
PATIENT INSTRUCTIONS     Will request recent labs from PCP at [facility] in [place].     Have your eye care provider fax last eye exam to our office.   Fax [phone].      Continue Medtronic 770G Insulin Pump    Send message in [facility] portal once 780G training is complete.    Basal Carb Ratio ISF Target/s AIT   0000 - 1.5  0800 - 1.5  2000 - 2.6 13 50 140 4 hrs     Be sure to enter glucose and carbs prior to meal for accurate correction boluses.    Continue Dexcom G6 CGM  Blood Sugar Goals:       Fastin-130.       1-2 hours after a meal: Less than 180.     U100 Pump Failure Plan:   We have decided to develop a plan in the event that your pump breaks or is nonfunctioning. After 4 hours without pump use, you should begin to consider putting your Pump Failure Back Up Plan into action especially if you foresee that you may not be able to use your pump for more than 20 hours. Since you are currently using short acting insulin (Humalog/Novolog/Admelog/Novolin R) in your pump, you will need access to your short acting insulin vial, syringes, and a back up long acting insulin in the event of a pump failure. I have sent a prescription for a long acting insulin to your pharmacy for use during your emergency pump failure plan. It is important that you keep both types of insulin/syringes with you so that you may have access to it at least 3 times daily if needed. This medication and syringes should be brought with you on overnight trips in the case of an emergency pump failure. This means making a plan to keeping your insulin and syringes at school, work, or other places you frequent. If needed, please reach out to my office about any letters you may need for permission to keep these items for emergency purposes. We will outline your plan for pump failure emergencies below, but please remember to contact the insulin pump company (toll free number is printed on the label on the back of the insulin pump) and our office if  you have a pump failure. When the insulin pump is restarted, do not restart basal rates until at least 22 hours after the last long acting insulin injection. You can set a 0% temporary basal setting that will last until this time and use your pump to bolus for meals and correction. If you need help with these feature, please call your insulin pump company tech support line or ask them in anticipation of this action.     Dosing:   If the insulin pump is non functional and discontinued for anticipated more than 20 hours, please give daily injections of:    Long Acting Insulin Dosing:   Lantus 40 units daily    Short Acting Insulin Dosing:   Novolog per IC of 13 (or 1 unit for every 13g of carbs) and ISF/CF of 50 (or 1 unit for every 50 pts above goal blood sugar of 140)  These settings should be in your Inpen Louie.

## 2023-12-01 ENCOUNTER — TELEPHONE (OUTPATIENT)
Dept: DIABETES | Facility: CLINIC | Age: 38
End: 2023-12-01

## 2023-12-01 DIAGNOSIS — E10.9 TYPE 1 DIABETES MELLITUS WITHOUT COMPLICATION: Primary | ICD-10-CM

## 2023-12-01 DIAGNOSIS — Z96.41 INSULIN PUMP IN PLACE: ICD-10-CM

## 2023-12-01 RX ORDER — BLOOD-GLUCOSE TRANSMITTER
1 EACH MISCELLANEOUS ONCE
Qty: 1 EACH | Refills: 1 | Status: SHIPPED | OUTPATIENT
Start: 2023-12-01 | End: 2023-12-13 | Stop reason: SDUPTHER

## 2023-12-01 RX ORDER — BLOOD-GLUCOSE SENSOR
1 EACH MISCELLANEOUS
Qty: 1 EACH | Refills: 11 | Status: SHIPPED | OUTPATIENT
Start: 2023-12-01 | End: 2023-12-13 | Stop reason: SDUPTHER

## 2023-12-01 NOTE — TELEPHONE ENCOUNTER
Patient called stating DMS not longer in network with her insurance. Will send order for Guardian 4 to [facility].

## 2023-12-07 ENCOUNTER — OFFICE VISIT (OUTPATIENT)
Dept: DIABETES | Facility: CLINIC | Age: 38
End: 2023-12-07
Payer: MEDICAID

## 2023-12-07 DIAGNOSIS — E10.9 TYPE 1 DIABETES MELLITUS WITHOUT COMPLICATION: Primary | ICD-10-CM

## 2023-12-07 PROCEDURE — 3061F PR NEG MICROALBUMINURIA RESULT DOCUMENTED/REVIEW: ICD-10-PCS | Mod: CPTII,95,, | Performed by: NURSE PRACTITIONER

## 2023-12-07 PROCEDURE — 4010F PR ACE/ARB THEARPY RXD/TAKEN: ICD-10-PCS | Mod: CPTII,95,, | Performed by: NURSE PRACTITIONER

## 2023-12-07 PROCEDURE — 99214 OFFICE O/P EST MOD 30 MIN: CPT | Mod: 95,,, | Performed by: NURSE PRACTITIONER

## 2023-12-07 PROCEDURE — 4010F ACE/ARB THERAPY RXD/TAKEN: CPT | Mod: CPTII,95,, | Performed by: NURSE PRACTITIONER

## 2023-12-07 PROCEDURE — 99214 PR OFFICE/OUTPT VISIT, EST, LEVL IV, 30-39 MIN: ICD-10-PCS | Mod: 95,,, | Performed by: NURSE PRACTITIONER

## 2023-12-07 PROCEDURE — 3061F NEG MICROALBUMINURIA REV: CPT | Mod: CPTII,95,, | Performed by: NURSE PRACTITIONER

## 2023-12-07 PROCEDURE — 3051F HG A1C>EQUAL 7.0%<8.0%: CPT | Mod: CPTII,95,, | Performed by: NURSE PRACTITIONER

## 2023-12-07 PROCEDURE — 3066F PR DOCUMENTATION OF TREATMENT FOR NEPHROPATHY: ICD-10-PCS | Mod: CPTII,95,, | Performed by: NURSE PRACTITIONER

## 2023-12-07 PROCEDURE — 3051F PR MOST RECENT HEMOGLOBIN A1C LEVEL 7.0 - < 8.0%: ICD-10-PCS | Mod: CPTII,95,, | Performed by: NURSE PRACTITIONER

## 2023-12-07 PROCEDURE — 3066F NEPHROPATHY DOC TX: CPT | Mod: CPTII,95,, | Performed by: NURSE PRACTITIONER

## 2023-12-07 NOTE — PROGRESS NOTES
The patient location is: Home  The chief complaint leading to consultation is: Diabetes    Visit type: audiovisual    Face to Face time with patient: 15  30 minutes of total time spent on the encounter, which includes face to face time and non-face to face time preparing to see the patient (eg, review of tests), Obtaining and/or reviewing separately obtained history, Documenting clinical information in the electronic or other health record, Independently interpreting results (not separately reported) and communicating results to the patient/family/caregiver, or Care coordination (not separately reported).  Each patient to whom he or she provides medical services by telemedicine is:  (1) informed of the relationship between the physician and patient and the respective role of any other health care provider with respect to management of the patient; and (2) notified that he or she may decline to receive medical services by telemedicine and may withdraw from such care at any time.    Notes:    Dominga Saldaña is a 38 y.o. female who presents for a follow up evaluation of Type 1 diabetes mellitus.     CHIEF COMPLAINT: Diabetes Consultation    PCP: No, Primary Doctor      Initial visit with me - 3/16/2023    The patient was initially diagnosed with diabetes at age 14.   Patient previously followed by me at [facility], last visit 1/6/2023.     Previous failed treatments include:        Social Documentation:  Patient lives in [place] with .  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  Exercise: bowling     Diabetes related complications:   none.   denies Pancreatitis  denies Gastroparesis  denies DKA  denies Hx/family Hx of MEN2/MTC  denies Frequent UTIs/yeast infections     Cardiovascular Risk Factors: obesity (BMI >30 kg/m2).    Diabetes Medications               insulin lispro 100 unit/mL Crtg Inject 1 mL (100 Units total) into the skin once daily.      insulin lispro 100 unit/mL injection Inject  "100 Units into the skin once daily. USE WITH INSULIN PUMP       Current monitoring regimen:   finger sticks.   Guardian 4 sensors approved on 12/5. Need to be shipped.   Having chandu issues with pump, will call tech support today to fix.     Recent hypoglycemic episodes: No.   Patient compliant with glucose checks and medication administration? Yes    DIABETES MANAGEMENT STATUS  Statin: Not taking  ACE/ARB: Not taking  Screening or Prevention Patient's value Goal Complete/Controlled?   HgA1C Testing and Control   Lab Results   Component Value Date    HGBA1C 7.7 (H) 07/06/2023      Annually/Less than 8% Yes     Lipid profile : 04/20/2023 Annually No     LDL control Lab Results   Component Value Date    LDLCALC Invalid, Trig>400.0 04/20/2023    Annually/Less than 100 mg/dl  No     Nephropathy screening Lab Results   Component Value Date    LABMICR 6.0 04/20/2023     No results found for: "PROTEINUA"  No results found for: "UTPCR"   Annually No     Blood pressure BP Readings from Last 1 Encounters:   10/05/23 106/70    Less than 140/90 Yes     Dilated retinal exam Most Recent Eye Exam Date: Not Found Annually No     Foot exam   : 10/05/2023 Annually No     Patient's medications, allergies, surgical, social and family histories were reviewed and updated as appropriate.   Hemoglobin A1C   Date Value Ref Range Status   07/06/2023 7.7 (H) 4.0 - 5.6 % Final     Comment:     ADA Screening Guidelines:  5.7-6.4%  Consistent with prediabetes  >or=6.5%  Consistent with diabetes    High levels of fetal hemoglobin interfere with the HbA1C  assay. Heterozygous hemoglobin variants (HbS, HgC, etc)do  not significantly interfere with this assay.   However, presence of multiple variants may affect accuracy.     04/20/2023 7.4 (H) 4.0 - 5.6 % Final     Comment:     ADA Screening Guidelines:  5.7-6.4%  Consistent with prediabetes  >or=6.5%  Consistent with diabetes    High levels of fetal hemoglobin interfere with the HbA1C  assay. " Heterozygous hemoglobin variants (HbS, HgC, etc)do  not significantly interfere with this assay.   However, presence of multiple variants may affect accuracy.     01/06/2023 7.2 (H) 4.7 - 5.6 % Final   09/29/2022 7.2 (H) 4.7 - 5.6 % Final   06/30/2022 7.1 (H) 4.7 - 5.6 % Final   05/12/2011 6.1 4.0 - 6.2 % Final       Review of Systems   Constitutional:  Negative for weight loss.   Eyes:  Negative for blurred vision and double vision.   Cardiovascular:  Negative for chest pain.   Gastrointestinal:  Negative for nausea and vomiting.   Genitourinary:  Negative for frequency.   Musculoskeletal:  Negative for falls.   Neurological:  Negative for dizziness and weakness.   Endo/Heme/Allergies:  Negative for polydipsia.   Psychiatric/Behavioral:  Negative for depression.    All other systems reviewed and are negative.       Physical Exam  Constitutional:       Appearance: Normal appearance.   HENT:      Head: Normocephalic and atraumatic.   Pulmonary:      Effort: No respiratory distress.   Musculoskeletal:      Cervical back: Normal range of motion.   Neurological:      Mental Status: She is alert and oriented to person, place, and time.   Psychiatric:         Mood and Affect: Mood normal.         Behavior: Behavior normal.      There were no vitals taken for this visit.  Wt Readings from Last 3 Encounters:   10/05/23 89.7 kg (197 lb 12.8 oz)   02/22/12 88 kg (194 lb)   01/10/12 86.6 kg (191 lb)       ASSESSMENT    ICD-10-CM ICD-9-CM   1. Type 1 diabetes mellitus without complication  E10.9 250.01           PLAN  Diagnoses and all orders for this visit:    Type 1 diabetes mellitus without complication        Reviewed pathophysiology of diabetes, complications related to the disease, importance of annual dilated eye exam and daily foot examination. Explained MOA, SE, dosage of medications. Written instructions given and reviewed with patient and patient verbalizes understanding.     3/16/2023: For the past 14 days, patient  average glucose was 268 mg/dL, with standard deviation of 48. She was above range 96% of the time, in range 4% of the time, and below range 0% of the time. The target range for this patient was 70 - 180 mg/dL. Overall, there was a pattern of hyperglycemia. Patient started new 770G pump last week, states she is unsure is she put in the correct settings. Will set up data sharing with [employer] once patient has login info. Scheduled for new pump training with [employer] next week.     3/30/23 - average glucose was 199 mg/dL, with standard deviation of 31. She was above range 57% of the time, in range 43% of the time, and below range 0% of the time.    2023 - overall improvement in glucoses, avg 173, no lows. F/u 6 weeks.     6/15/2023 - patient had covid 2 weeks ago and glucoses were higher, also does not think she was putting in her corrections/boluses in correctly, not hitting deliver. Will upgrade to 780G with Guardian 4 CGM.     2023 - pump upgrade pending. Pattern of hyperglycemia after supper and overnight. Not entering glucose with carbs for correction dosing. Will increase 8pm basal to 2.4. f/u 6 weeks.     2023 - overall improvement. Will receive Guardian and pump supplies today, has been assigned for upgrade training. She will call after work today and send message when complete.      10/5/2023 - has completed 780 training, but never received the Guardian sensors. A1c done yesterday at PCP, 7.3.    2023 -     PATIENT INSTRUCTIONS     Have your eye care provider fax last eye exam to our office.   Fax [phone].      Continue Medtronic 780G Insulin Pump        Basal Carb Ratio ISF Target/s AIT   0000 - 1.5  0800 - 1.5  2000 - 2.6 13 50 140 4 hrs     Be sure to enter glucose and carbs prior to meal for accurate correction boluses.    Continue Dexcom G6 CGM  Blood Sugar Goals:       Fastin-130.       1-2 hours after a meal: Less than 180.     U100 Pump Failure Plan:   We have decided to  develop a plan in the event that your pump breaks or is nonfunctioning. After 4 hours without pump use, you should begin to consider putting your Pump Failure Back Up Plan into action especially if you foresee that you may not be able to use your pump for more than 20 hours. Since you are currently using short acting insulin (Humalog/Novolog/Admelog/Novolin R) in your pump, you will need access to your short acting insulin vial, syringes, and a back up long acting insulin in the event of a pump failure. I have sent a prescription for a long acting insulin to your pharmacy for use during your emergency pump failure plan. It is important that you keep both types of insulin/syringes with you so that you may have access to it at least 3 times daily if needed. This medication and syringes should be brought with you on overnight trips in the case of an emergency pump failure. This means making a plan to keeping your insulin and syringes at school, work, or other places you frequent. If needed, please reach out to my office about any letters you may need for permission to keep these items for emergency purposes. We will outline your plan for pump failure emergencies below, but please remember to contact the insulin pump company (toll free number is printed on the label on the back of the insulin pump) and our office if you have a pump failure. When the insulin pump is restarted, do not restart basal rates until at least 22 hours after the last long acting insulin injection. You can set a 0% temporary basal setting that will last until this time and use your pump to bolus for meals and correction. If you need help with these feature, please call your insulin pump company tech support line or ask them in anticipation of this action.     Dosing:   If the insulin pump is non functional and discontinued for anticipated more than 20 hours, please give daily injections of:    Long Acting Insulin Dosing:   Lantus 40 units  daily    Short Acting Insulin Dosing:   Novolog per IC of 13 (or 1 unit for every 13g of carbs) and ISF/CF of 50 (or 1 unit for every 50 pts above goal blood sugar of 140)  These settings should be in your Inpen Louie.     No follow-ups on file.    Portions of this note were prepared with [employer] Naturally Speaking voice recognition transcription software. Grammatical errors, including garbled syntax, mangle pronouns, and other bizarre constructions may be attributed to that software system.

## 2023-12-13 ENCOUNTER — TELEPHONE (OUTPATIENT)
Dept: DIABETES | Facility: CLINIC | Age: 38
End: 2023-12-13

## 2023-12-13 DIAGNOSIS — E10.9 TYPE 1 DIABETES MELLITUS WITHOUT COMPLICATION: ICD-10-CM

## 2023-12-13 DIAGNOSIS — Z96.41 INSULIN PUMP IN PLACE: ICD-10-CM

## 2023-12-13 RX ORDER — BLOOD-GLUCOSE SENSOR
1 EACH MISCELLANEOUS
Qty: 1 EACH | Refills: 11 | Status: SHIPPED | OUTPATIENT
Start: 2023-12-13 | End: 2023-12-13 | Stop reason: SDUPTHER

## 2023-12-13 RX ORDER — BLOOD-GLUCOSE TRANSMITTER
1 EACH MISCELLANEOUS ONCE
Qty: 1 EACH | Refills: 1 | Status: SHIPPED | OUTPATIENT
Start: 2023-12-13 | End: 2023-12-13 | Stop reason: SDUPTHER

## 2023-12-15 RX ORDER — BLOOD-GLUCOSE SENSOR
1 EACH MISCELLANEOUS
Qty: 1 EACH | Refills: 11 | Status: SHIPPED | OUTPATIENT
Start: 2023-12-15 | End: 2023-12-20 | Stop reason: SDUPTHER

## 2023-12-15 RX ORDER — BLOOD-GLUCOSE TRANSMITTER
1 EACH MISCELLANEOUS ONCE
Qty: 1 EACH | Refills: 1 | Status: SHIPPED | OUTPATIENT
Start: 2023-12-15 | End: 2023-12-20 | Stop reason: SDUPTHER

## 2023-12-20 ENCOUNTER — TELEPHONE (OUTPATIENT)
Dept: DIABETES | Facility: CLINIC | Age: 38
End: 2023-12-20

## 2023-12-20 DIAGNOSIS — E10.9 TYPE 1 DIABETES MELLITUS WITHOUT COMPLICATION: ICD-10-CM

## 2023-12-20 DIAGNOSIS — Z96.41 INSULIN PUMP IN PLACE: ICD-10-CM

## 2023-12-20 RX ORDER — BLOOD-GLUCOSE SENSOR
1 EACH MISCELLANEOUS
Qty: 1 EACH | Refills: 11 | Status: SHIPPED | OUTPATIENT
Start: 2023-12-20 | End: 2024-12-19

## 2023-12-20 RX ORDER — BLOOD-GLUCOSE TRANSMITTER
1 EACH MISCELLANEOUS ONCE
Qty: 1 EACH | Refills: 1 | Status: SHIPPED | OUTPATIENT
Start: 2023-12-20 | End: 2023-12-20

## 2024-01-22 ENCOUNTER — TELEPHONE (OUTPATIENT)
Dept: DIABETES | Facility: CLINIC | Age: 39
End: 2024-01-22
Payer: COMMERCIAL

## 2024-01-22 NOTE — TELEPHONE ENCOUNTER
----- Message from Shira Khan sent at 1/22/2024  2:47 PM CST -----  Contact: Alanna/ [facility]  Alanna is calling in regards to checking the status of fax that was sent on 01/16. Please call back at [phone] option [phone]                       Thanks  KT

## 2024-01-22 NOTE — TELEPHONE ENCOUNTER
Called [facility] to inform them that we have not received their fax. They stated they would fax it again

## 2024-01-23 ENCOUNTER — TELEPHONE (OUTPATIENT)
Dept: DIABETES | Facility: CLINIC | Age: 39
End: 2024-01-23
Payer: COMMERCIAL

## 2024-01-23 NOTE — TELEPHONE ENCOUNTER
Faxed last chart note and LMN for insulin pump and supplies to [facility]. Form scanned to media

## 2024-03-11 DIAGNOSIS — E10.9 TYPE 1 DIABETES MELLITUS WITHOUT COMPLICATION: ICD-10-CM

## 2024-03-11 RX ORDER — INSULIN LISPRO 100 [IU]/ML
100 INJECTION, SOLUTION INTRAVENOUS; SUBCUTANEOUS DAILY
Qty: 90 ML | Refills: 3 | Status: CANCELLED | OUTPATIENT
Start: 2024-03-11 | End: 2025-03-11

## 2024-03-12 RX ORDER — INSULIN ASPART 100 [IU]/ML
INJECTION, SOLUTION INTRAVENOUS; SUBCUTANEOUS
Qty: 90 ML | Refills: 3 | Status: SHIPPED | OUTPATIENT
Start: 2024-03-12

## 2024-04-08 DIAGNOSIS — Z96.41 INSULIN PUMP IN PLACE: ICD-10-CM

## 2024-04-08 DIAGNOSIS — E10.9 TYPE 1 DIABETES MELLITUS WITHOUT COMPLICATION: ICD-10-CM

## 2024-04-08 RX ORDER — BLOOD-GLUCOSE SENSOR
1 EACH MISCELLANEOUS
Qty: 1 EACH | Refills: 11 | Status: SHIPPED | OUTPATIENT
Start: 2024-04-08 | End: 2024-06-19 | Stop reason: SDUPTHER

## 2024-06-19 ENCOUNTER — TELEPHONE (OUTPATIENT)
Dept: DIABETES | Facility: CLINIC | Age: 39
End: 2024-06-19

## 2024-06-19 ENCOUNTER — PATIENT MESSAGE (OUTPATIENT)
Dept: DIABETES | Facility: CLINIC | Age: 39
End: 2024-06-19
Payer: COMMERCIAL

## 2024-06-19 DIAGNOSIS — E10.9 TYPE 1 DIABETES MELLITUS WITHOUT COMPLICATION: ICD-10-CM

## 2024-06-19 DIAGNOSIS — Z96.41 INSULIN PUMP IN PLACE: ICD-10-CM

## 2024-06-19 RX ORDER — BLOOD-GLUCOSE TRANSMITTER
1 EACH MISCELLANEOUS ONCE
Qty: 1 EACH | Refills: 1 | Status: SHIPPED | OUTPATIENT
Start: 2024-06-19 | End: 2024-06-19

## 2024-06-19 RX ORDER — BLOOD-GLUCOSE SENSOR
1 EACH MISCELLANEOUS
Qty: 1 EACH | Refills: 11 | Status: SHIPPED | OUTPATIENT
Start: 2024-06-19 | End: 2025-06-19

## 2024-06-19 RX ORDER — BLOOD-GLUCOSE SENSOR
1 EACH MISCELLANEOUS
Qty: 1 EACH | Refills: 11 | Status: SHIPPED | OUTPATIENT
Start: 2024-06-19 | End: 2024-06-19

## 2024-10-08 DIAGNOSIS — Z96.41 INSULIN PUMP IN PLACE: ICD-10-CM

## 2024-10-08 DIAGNOSIS — E10.9 TYPE 1 DIABETES MELLITUS WITHOUT COMPLICATION: Primary | ICD-10-CM

## 2024-10-08 RX ORDER — BLOOD-GLUCOSE SENSOR
1 EACH MISCELLANEOUS
Qty: 1 EACH | Refills: 11 | Status: SHIPPED | OUTPATIENT
Start: 2024-10-08 | End: 2025-10-08

## 2024-10-16 ENCOUNTER — TELEPHONE (OUTPATIENT)
Dept: DIABETES | Facility: CLINIC | Age: 39
End: 2024-10-16
Payer: COMMERCIAL

## 2024-10-16 ENCOUNTER — PATIENT MESSAGE (OUTPATIENT)
Dept: DIABETES | Facility: CLINIC | Age: 39
End: 2024-10-16
Payer: COMMERCIAL

## 2024-10-16 NOTE — TELEPHONE ENCOUNTER
Received outside lab results from [employer]. Result uploaded to [email or web address] media and forwarded to provider.

## 2024-10-17 ENCOUNTER — TELEPHONE (OUTPATIENT)
Dept: DIABETES | Facility: CLINIC | Age: 39
End: 2024-10-17
Payer: COMMERCIAL

## 2024-10-17 ENCOUNTER — OFFICE VISIT (OUTPATIENT)
Dept: DIABETES | Facility: CLINIC | Age: 39
End: 2024-10-17
Payer: COMMERCIAL

## 2024-10-17 ENCOUNTER — TELEPHONE (OUTPATIENT)
Dept: DIABETES | Facility: CLINIC | Age: 39
End: 2024-10-17

## 2024-10-17 DIAGNOSIS — E10.9 TYPE 1 DIABETES MELLITUS WITHOUT COMPLICATION: Primary | ICD-10-CM

## 2024-10-17 PROCEDURE — 99214 OFFICE O/P EST MOD 30 MIN: CPT | Mod: 95,,, | Performed by: NURSE PRACTITIONER

## 2024-10-17 PROCEDURE — G2211 COMPLEX E/M VISIT ADD ON: HCPCS | Mod: 95,,, | Performed by: NURSE PRACTITIONER

## 2024-10-17 NOTE — PROGRESS NOTES
The patient location is: Home  The chief complaint leading to consultation is: Diabetes    Visit type: audiovisual    Face to Face time with patient: 15  30 minutes of total time spent on the encounter, which includes face to face time and non-face to face time preparing to see the patient (eg, review of tests), Obtaining and/or reviewing separately obtained history, Documenting clinical information in the electronic or other health record, Independently interpreting results (not separately reported) and communicating results to the patient/family/caregiver, or Care coordination (not separately reported).  Each patient to whom he or she provides medical services by telemedicine is:  (1) informed of the relationship between the physician and patient and the respective role of any other health care provider with respect to management of the patient; and (2) notified that he or she may decline to receive medical services by telemedicine and may withdraw from such care at any time.    Notes:    Dominga Saldaña is a 38 y.o. female who presents for a follow up evaluation of Type 1 diabetes mellitus.     CHIEF COMPLAINT: Diabetes Consultation    PCP: No, Primary Doctor      Initial visit with me - 3/16/2023    The patient was initially diagnosed with diabetes at age 14.   Patient previously followed by me at [facility], last visit 1/6/2023.     Previous failed treatments include:        Social Documentation:  Patient lives in [place] with .  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  Exercise: bowling     Diabetes related complications:   none.   denies Pancreatitis  denies Gastroparesis  denies DKA  denies Hx/family Hx of MEN2/MTC  denies Frequent UTIs/yeast infections     Cardiovascular Risk Factors: obesity (BMI >30 kg/m2).    Diabetes Medications               insulin aspart U-100 (NOVOLOG U-100 INSULIN ASPART) 100 unit/mL injection USE WITH INSULIN PUMP.  UNITS MAX     Current monitoring  "regimen:      Recent hypoglycemic episodes: No.   Patient compliant with glucose checks and medication administration? Yes    DIABETES MANAGEMENT STATUS  Statin: Not taking  ACE/ARB: Not taking  Screening or Prevention Patient's value Goal Complete/Controlled?   HgA1C Testing and Control   Lab Results   Component Value Date    HGBA1C 7.7 (H) 07/06/2023      Annually/Less than 8% Yes     Lipid profile : 04/20/2023 Annually No     LDL control Lab Results   Component Value Date    LDLCALC Invalid, Trig>400.0 04/20/2023    Annually/Less than 100 mg/dl  No     Nephropathy screening Lab Results   Component Value Date    LABMICR 6.0 04/20/2023     No results found for: "PROTEINUA"  No results found for: "UTPCR"   Annually No     Blood pressure BP Readings from Last 1 Encounters:   10/05/23 106/70    Less than 140/90 Yes     Dilated retinal exam Most Recent Eye Exam Date: Not Found Annually No     Foot exam   : 10/05/2023 Annually No     Patient's medications, allergies, surgical, social and family histories were reviewed and updated as appropriate.   Hemoglobin A1C   Date Value Ref Range Status   07/06/2023 7.7 (H) 4.0 - 5.6 % Final     Comment:     ADA Screening Guidelines:  5.7-6.4%  Consistent with prediabetes  >or=6.5%  Consistent with diabetes    High levels of fetal hemoglobin interfere with the HbA1C  assay. Heterozygous hemoglobin variants (HbS, HgC, etc)do  not significantly interfere with this assay.   However, presence of multiple variants may affect accuracy.     04/20/2023 7.4 (H) 4.0 - 5.6 % Final     Comment:     ADA Screening Guidelines:  5.7-6.4%  Consistent with prediabetes  >or=6.5%  Consistent with diabetes    High levels of fetal hemoglobin interfere with the HbA1C  assay. Heterozygous hemoglobin variants (HbS, HgC, etc)do  not significantly interfere with this assay.   However, presence of multiple variants may affect accuracy.     01/06/2023 7.2 (H) 4.7 - 5.6 % Final   09/29/2022 7.2 (H) 4.7 - 5.6 % " Final   06/30/2022 7.1 (H) 4.7 - 5.6 % Final   05/12/2011 6.1 4.0 - 6.2 % Final       Review of Systems   Constitutional:  Negative for weight loss.   Eyes:  Negative for blurred vision and double vision.   Cardiovascular:  Negative for chest pain.   Gastrointestinal:  Negative for nausea and vomiting.   Genitourinary:  Negative for frequency.   Musculoskeletal:  Negative for falls.   Neurological:  Negative for dizziness and weakness.   Endo/Heme/Allergies:  Negative for polydipsia.   Psychiatric/Behavioral:  Negative for depression.    All other systems reviewed and are negative.       Physical Exam  Constitutional:       Appearance: Normal appearance.   HENT:      Head: Normocephalic and atraumatic.   Pulmonary:      Effort: No respiratory distress.   Musculoskeletal:      Cervical back: Normal range of motion.   Neurological:      Mental Status: She is alert and oriented to person, place, and time.   Psychiatric:         Mood and Affect: Mood normal.         Behavior: Behavior normal.        There were no vitals taken for this visit.  Wt Readings from Last 3 Encounters:   10/05/23 89.7 kg (197 lb 12.8 oz)   02/22/12 88 kg (194 lb)   01/10/12 86.6 kg (191 lb)       ASSESSMENT    ICD-10-CM ICD-9-CM   1. Type 1 diabetes mellitus without complication  E10.9 250.01           PLAN  Diagnoses and all orders for this visit:    Type 1 diabetes mellitus without complication        Reviewed pathophysiology of diabetes, complications related to the disease, importance of annual dilated eye exam and daily foot examination. Explained MOA, SE, dosage of medications. Written instructions given and reviewed with patient and patient verbalizes understanding.     3/16/2023: For the past 14 days, patient average glucose was 268 mg/dL, with standard deviation of 48. She was above range 96% of the time, in range 4% of the time, and below range 0% of the time. The target range for this patient was 70 - 180 mg/dL. Overall, there was a  pattern of hyperglycemia. Patient started new 770G pump last week, states she is unsure is she put in the correct settings. Will set up data sharing with [employer] once patient has login info. Scheduled for new pump training with [employer] next week.     3/30/23 - average glucose was 199 mg/dL, with standard deviation of 31. She was above range 57% of the time, in range 43% of the time, and below range 0% of the time.    4/27/2023 - overall improvement in glucoses, avg 173, no lows. F/u 6 weeks.     6/15/2023 - patient had covid 2 weeks ago and glucoses were higher, also does not think she was putting in her corrections/boluses in correctly, not hitting deliver. Will upgrade to 780G with Guardian 4 CGM.     7/13/2023 - pump upgrade pending. Pattern of hyperglycemia after supper and overnight. Not entering glucose with carbs for correction dosing. Will increase 8pm basal to 2.4. f/u 6 weeks.     8/23/2023 - overall improvement. Will receive Guardian and pump supplies today, has been assigned for upgrade training. She will call after work today and send message when complete.      10/5/2023 - has completed 780 training, but never received the Guardian sensors. A1c done yesterday at PCP, 7.3.    12/7/2023 -     10/17/2024 - Guardian 4 sensors too expensive from pharmacy, $150/month. Non-preferred with her insurance. Will send to [employer] as they are medically necessary as compatible sensor for her insulin pump.     PATIENT INSTRUCTIONS     Have your eye care provider fax last eye exam to our office.   Fax [phone].      Continue Medtronic 780G Insulin Pump with Smartguard.    Basal Carb Ratio ISF Target/s AIT   0000 - 1.5  0800 - 1.5  2000 - 2.6 13 50 140    Smartguard Target: 100 4 hrs     U100 Pump Failure Plan:   We have decided to develop a plan in the event that your pump breaks or is nonfunctioning. After 4 hours without pump use, you should begin to consider putting your Pump Failure Back Up Plan into action  especially if you foresee that you may not be able to use your pump for more than 20 hours. Since you are currently using short acting insulin (Humalog/Novolog/Admelog/Novolin R) in your pump, you will need access to your short acting insulin vial, syringes, and a back up long acting insulin in the event of a pump failure. I have sent a prescription for a long acting insulin to your pharmacy for use during your emergency pump failure plan. It is important that you keep both types of insulin/syringes with you so that you may have access to it at least 3 times daily if needed. This medication and syringes should be brought with you on overnight trips in the case of an emergency pump failure. This means making a plan to keeping your insulin and syringes at school, work, or other places you frequent. If needed, please reach out to my office about any letters you may need for permission to keep these items for emergency purposes. We will outline your plan for pump failure emergencies below, but please remember to contact the insulin pump company (toll free number is printed on the label on the back of the insulin pump) and our office if you have a pump failure. When the insulin pump is restarted, do not restart basal rates until at least 22 hours after the last long acting insulin injection. You can set a 0% temporary basal setting that will last until this time and use your pump to bolus for meals and correction. If you need help with these feature, please call your insulin pump company tech support line or ask them in anticipation of this action.     Dosing:   If the insulin pump is non functional and discontinued for anticipated more than 20 hours, please give daily injections of:    Long Acting Insulin Dosing:   Lantus 40 units daily    Short Acting Insulin Dosing:   Novolog per IC of 13 (or 1 unit for every 13g of carbs) and ISF/CF of 50 (or 1 unit for every 50 pts above goal blood sugar of 140)  These settings  should be in your Inpen Louie.     Follow up in about 3 months (around 1/17/2025) for [employer]tr[employer].    Portions of this note were prepared with [employer] Naturally Speaking voice recognition transcription software. Grammatical errors, including garbled syntax, mangle pronouns, and other bizarre constructions may be attributed to that software system.

## 2024-10-17 NOTE — Clinical Note
Medtronic 3 months Send todays chart notes with order for guardian sensors and transmitter to [employer].

## 2024-10-17 NOTE — TELEPHONE ENCOUNTER
Guardian 4 sensors too expensive at [facility] pharmacy. Will send to [employer] through [employer].

## 2024-10-17 NOTE — TELEPHONE ENCOUNTER
----- Message from MILENA Brizuela sent at 10/17/2024  2:10 PM CDT -----  Medtronic 3 months  Send todays chart notes with order for guardian sensors and transmitter to [employer].

## 2024-10-24 ENCOUNTER — PATIENT MESSAGE (OUTPATIENT)
Dept: RESEARCH | Facility: HOSPITAL | Age: 39
End: 2024-10-24
Payer: COMMERCIAL

## 2024-11-06 DIAGNOSIS — E10.9 TYPE 1 DIABETES MELLITUS WITHOUT COMPLICATION: ICD-10-CM

## 2024-11-06 RX ORDER — GABAPENTIN 300 MG/1
300 CAPSULE ORAL NIGHTLY
Qty: 90 CAPSULE | Refills: 3 | Status: SHIPPED | OUTPATIENT
Start: 2024-11-06 | End: 2025-11-06

## 2024-11-06 NOTE — TELEPHONE ENCOUNTER
Requested Prescriptions     Pending Prescriptions Disp Refills    gabapentin (NEURONTIN) 300 MG capsule 90 capsule 3     Sig: Take 1 capsule (300 mg total) by mouth every evening.     LOV: 10/17/2024  Next Office Visit: 1/23/2025

## 2025-01-22 ENCOUNTER — TELEPHONE (OUTPATIENT)
Dept: DIABETES | Facility: CLINIC | Age: 40
End: 2025-01-22
Payer: COMMERCIAL

## 2025-01-22 NOTE — TELEPHONE ENCOUNTER
Tried to contact patient to r/s 01/23 visit to virtual due to weather. No answer, VM Full. Portal Messages also sent

## 2025-01-23 ENCOUNTER — OFFICE VISIT (OUTPATIENT)
Dept: DIABETES | Facility: CLINIC | Age: 40
End: 2025-01-23
Payer: COMMERCIAL

## 2025-01-23 DIAGNOSIS — E10.9 TYPE 1 DIABETES MELLITUS WITHOUT COMPLICATION: Primary | ICD-10-CM

## 2025-01-23 NOTE — PROGRESS NOTES
The patient location is: Home  The chief complaint leading to consultation is: Diabetes    Visit type: audiovisual    Face to Face time with patient: 15  30 minutes of total time spent on the encounter, which includes face to face time and non-face to face time preparing to see the patient (eg, review of tests), Obtaining and/or reviewing separately obtained history, Documenting clinical information in the electronic or other health record, Independently interpreting results (not separately reported) and communicating results to the patient/family/caregiver, or Care coordination (not separately reported).  Each patient to whom he or she provides medical services by telemedicine is:  (1) informed of the relationship between the physician and patient and the respective role of any other health care provider with respect to management of the patient; and (2) notified that he or she may decline to receive medical services by telemedicine and may withdraw from such care at any time.    Notes:    Dominga Saldaña is a 39 y.o. female who presents for a follow up evaluation of Type 1 diabetes mellitus.     CHIEF COMPLAINT: Diabetes Consultation    PCP: No, Primary Doctor      Initial visit with me - 3/16/2023    The patient was initially diagnosed with diabetes at age 14.   Patient previously followed by me at [facility], last visit 1/6/2023.     Previous failed treatments include:        Social Documentation:  Patient lives in [place] with .  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  Exercise: bowling     Diabetes related complications:   none.   denies Pancreatitis  denies Gastroparesis  denies DKA  denies Hx/family Hx of MEN2/MTC  denies Frequent UTIs/yeast infections     Cardiovascular Risk Factors: obesity (BMI >30 kg/m2).    Diabetes Medications               insulin aspart U-100 (NOVOLOG U-100 INSULIN ASPART) 100 unit/mL injection USE WITH INSULIN PUMP.  UNITS MAX     Current monitoring  "regimen:      Recent hypoglycemic episodes: No.   Patient compliant with glucose checks and medication administration? Yes    DIABETES MANAGEMENT STATUS  Statin: Not taking  ACE/ARB: Not taking  Screening or Prevention Patient's value Goal Complete/Controlled?   HgA1C Testing and Control   Lab Results   Component Value Date    HGBA1C 7.7 (H) 07/06/2023      Annually/Less than 8% Yes     Lipid profile : 04/20/2023 Annually No     LDL control Lab Results   Component Value Date    LDLCALC Invalid, Trig>400.0 04/20/2023    Annually/Less than 100 mg/dl  No     Nephropathy screening Lab Results   Component Value Date    LABMICR 6.0 04/20/2023     No results found for: "PROTEINUA"  No results found for: "UTPCR"   Annually No     Blood pressure BP Readings from Last 1 Encounters:   10/05/23 106/70    Less than 140/90 Yes     Dilated retinal exam Most Recent Eye Exam Date: Not Found Annually No     Foot exam   : 10/05/2023 Annually No     Patient's medications, allergies, surgical, social and family histories were reviewed and updated as appropriate.   Hemoglobin A1C   Date Value Ref Range Status   07/06/2023 7.7 (H) 4.0 - 5.6 % Final     Comment:     ADA Screening Guidelines:  5.7-6.4%  Consistent with prediabetes  >or=6.5%  Consistent with diabetes    High levels of fetal hemoglobin interfere with the HbA1C  assay. Heterozygous hemoglobin variants (HbS, HgC, etc)do  not significantly interfere with this assay.   However, presence of multiple variants may affect accuracy.     04/20/2023 7.4 (H) 4.0 - 5.6 % Final     Comment:     ADA Screening Guidelines:  5.7-6.4%  Consistent with prediabetes  >or=6.5%  Consistent with diabetes    High levels of fetal hemoglobin interfere with the HbA1C  assay. Heterozygous hemoglobin variants (HbS, HgC, etc)do  not significantly interfere with this assay.   However, presence of multiple variants may affect accuracy.     01/06/2023 7.2 (H) 4.7 - 5.6 % Final   09/29/2022 7.2 (H) 4.7 - 5.6 % " Final   06/30/2022 7.1 (H) 4.7 - 5.6 % Final   05/12/2011 6.1 4.0 - 6.2 % Final       Review of Systems   Constitutional:  Negative for weight loss.   Eyes:  Negative for blurred vision and double vision.   Cardiovascular:  Negative for chest pain.   Gastrointestinal:  Negative for nausea and vomiting.   Genitourinary:  Negative for frequency.   Musculoskeletal:  Negative for falls.   Neurological:  Negative for dizziness and weakness.   Endo/Heme/Allergies:  Negative for polydipsia.   Psychiatric/Behavioral:  Negative for depression.    All other systems reviewed and are negative.       Physical Exam  Constitutional:       Appearance: Normal appearance.   HENT:      Head: Normocephalic and atraumatic.   Pulmonary:      Effort: No respiratory distress.   Musculoskeletal:      Cervical back: Normal range of motion.   Neurological:      Mental Status: She is alert and oriented to person, place, and time.   Psychiatric:         Mood and Affect: Mood normal.         Behavior: Behavior normal.        There were no vitals taken for this visit.  Wt Readings from Last 3 Encounters:   10/05/23 89.7 kg (197 lb 12.8 oz)   02/22/12 88 kg (194 lb)   01/10/12 86.6 kg (191 lb)       ASSESSMENT  No diagnosis found.          PLAN  There are no diagnoses linked to this encounter.      Reviewed pathophysiology of diabetes, complications related to the disease, importance of annual dilated eye exam and daily foot examination. Explained MOA, SE, dosage of medications. Written instructions given and reviewed with patient and patient verbalizes understanding.     3/16/2023: For the past 14 days, patient average glucose was 268 mg/dL, with standard deviation of 48. She was above range 96% of the time, in range 4% of the time, and below range 0% of the time. The target range for this patient was 70 - 180 mg/dL. Overall, there was a pattern of hyperglycemia. Patient started new 770G pump last week, states she is unsure is she put in the  correct settings. Will set up data sharing with [employer] once patient has login info. Scheduled for new pump training with [employer] next week.     3/30/23 - average glucose was 199 mg/dL, with standard deviation of 31. She was above range 57% of the time, in range 43% of the time, and below range 0% of the time.    4/27/2023 - overall improvement in glucoses, avg 173, no lows. F/u 6 weeks.     6/15/2023 - patient had covid 2 weeks ago and glucoses were higher, also does not think she was putting in her corrections/boluses in correctly, not hitting deliver. Will upgrade to 780G with Guardian 4 CGM.     7/13/2023 - pump upgrade pending. Pattern of hyperglycemia after supper and overnight. Not entering glucose with carbs for correction dosing. Will increase 8pm basal to 2.4. f/u 6 weeks.     8/23/2023 - overall improvement. Will receive Guardian and pump supplies today, has been assigned for upgrade training. She will call after work today and send message when complete.      10/5/2023 - has completed 780 training, but never received the Guardian sensors. A1c done yesterday at PCP, 7.3.    12/7/2023 -     10/17/2024 - Guardian 4 sensors too expensive from pharmacy, $150/month. Non-preferred with her insurance. Will send to [employer] as they are medically necessary as compatible sensor for her insulin pump.         PATIENT INSTRUCTIONS     Have your eye care provider fax last eye exam to our office.   Fax [phone].      Continue [employer] 780G Insulin Pump with Smartguard.    Basal Carb Ratio ISF Target/s AIT   0000 - 1.5  0800 - 1.5  2000 - 2.6 13 50 140    Smartguard Target: 100 4 hrs     U100 Pump Failure Plan:   We have decided to develop a plan in the event that your pump breaks or is nonfunctioning. After 4 hours without pump use, you should begin to consider putting your Pump Failure Back Up Plan into action especially if you foresee that you may not be able to use your pump for more than 20 hours. Since you  are currently using short acting insulin (Humalog/Novolog/Admelog/Novolin R) in your pump, you will need access to your short acting insulin vial, syringes, and a back up long acting insulin in the event of a pump failure. I have sent a prescription for a long acting insulin to your pharmacy for use during your emergency pump failure plan. It is important that you keep both types of insulin/syringes with you so that you may have access to it at least 3 times daily if needed. This medication and syringes should be brought with you on overnight trips in the case of an emergency pump failure. This means making a plan to keeping your insulin and syringes at school, work, or other places you frequent. If needed, please reach out to my office about any letters you may need for permission to keep these items for emergency purposes. We will outline your plan for pump failure emergencies below, but please remember to contact the insulin pump company (toll free number is printed on the label on the back of the insulin pump) and our office if you have a pump failure. When the insulin pump is restarted, do not restart basal rates until at least 22 hours after the last long acting insulin injection. You can set a 0% temporary basal setting that will last until this time and use your pump to bolus for meals and correction. If you need help with these feature, please call your insulin pump company tech support line or ask them in anticipation of this action.     Dosing:   If the insulin pump is non functional and discontinued for anticipated more than 20 hours, please give daily injections of:    Long Acting Insulin Dosing:   Lantus 40 units daily    Short Acting Insulin Dosing:   Novolog per IC of 13 (or 1 unit for every 13g of carbs) and ISF/CF of 50 (or 1 unit for every 50 pts above goal blood sugar of 140)  These settings should be in your [place].     No follow-ups on file.    Portions of this note were prepared with [employer] voice recognition transcription software. Grammatical errors, including garbled syntax, mangle pronouns, and other bizarre constructions may be attributed to that software system.

## 2025-02-27 ENCOUNTER — PATIENT MESSAGE (OUTPATIENT)
Dept: DIABETES | Facility: CLINIC | Age: 40
End: 2025-02-27
Payer: COMMERCIAL

## 2025-02-27 DIAGNOSIS — E10.9 TYPE 1 DIABETES MELLITUS WITHOUT COMPLICATION: ICD-10-CM

## 2025-02-27 RX ORDER — INSULIN ASPART 100 [IU]/ML
INJECTION, SOLUTION INTRAVENOUS; SUBCUTANEOUS
Qty: 90 ML | Refills: 3 | Status: SHIPPED | OUTPATIENT
Start: 2025-02-27

## 2025-02-27 NOTE — TELEPHONE ENCOUNTER
Patient is pulling insulin out of humalog cartridge to put into pump. Will send novolog vials to check cost.

## 2025-03-25 ENCOUNTER — TELEPHONE (OUTPATIENT)
Dept: DIABETES | Facility: CLINIC | Age: 40
End: 2025-03-25
Payer: COMMERCIAL

## 2025-03-25 DIAGNOSIS — Z96.41 INSULIN PUMP IN PLACE: ICD-10-CM

## 2025-03-25 DIAGNOSIS — E10.9 TYPE 1 DIABETES MELLITUS WITHOUT COMPLICATION: Primary | ICD-10-CM

## 2025-03-25 RX ORDER — INSULIN LISPRO 100 [IU]/ML
INJECTION, SOLUTION INTRAVENOUS; SUBCUTANEOUS
Qty: 60 ML | Refills: 3 | Status: SHIPPED | OUTPATIENT
Start: 2025-03-25

## 2025-03-25 NOTE — TELEPHONE ENCOUNTER
Patient called states novolog not controlling glucoses as well as humalog was. We had to change due to insurance preference. Will pull [facility] report.

## 2025-07-03 DIAGNOSIS — Z96.41 INSULIN PUMP IN PLACE: ICD-10-CM

## 2025-07-03 DIAGNOSIS — E10.9 TYPE 1 DIABETES MELLITUS WITHOUT COMPLICATION: ICD-10-CM

## 2025-07-03 RX ORDER — BLOOD-GLUCOSE SENSOR
1 EACH MISCELLANEOUS
Qty: 1 EACH | Refills: 11 | Status: SHIPPED | OUTPATIENT
Start: 2025-07-03 | End: 2026-07-03

## 2025-08-14 ENCOUNTER — OFFICE VISIT (OUTPATIENT)
Dept: DIABETES | Facility: CLINIC | Age: 40
End: 2025-08-14
Payer: COMMERCIAL

## 2025-08-14 DIAGNOSIS — Z96.41 INSULIN PUMP IN PLACE: ICD-10-CM

## 2025-08-14 DIAGNOSIS — E10.9 TYPE 1 DIABETES MELLITUS WITHOUT COMPLICATION: Primary | ICD-10-CM

## 2025-08-14 PROCEDURE — G2211 COMPLEX E/M VISIT ADD ON: HCPCS | Mod: 95,,, | Performed by: NURSE PRACTITIONER

## 2025-08-14 PROCEDURE — 4010F ACE/ARB THERAPY RXD/TAKEN: CPT | Mod: CPTII,95,, | Performed by: NURSE PRACTITIONER

## 2025-08-14 PROCEDURE — 98006 SYNCH AUDIO-VIDEO EST MOD 30: CPT | Mod: 95,,, | Performed by: NURSE PRACTITIONER

## 2025-08-18 ENCOUNTER — PATIENT MESSAGE (OUTPATIENT)
Dept: DIABETES | Facility: CLINIC | Age: 40
End: 2025-08-18
Payer: COMMERCIAL

## 2025-08-19 ENCOUNTER — LAB VISIT (OUTPATIENT)
Dept: LAB | Facility: HOSPITAL | Age: 40
End: 2025-08-19
Payer: COMMERCIAL

## 2025-08-19 DIAGNOSIS — E10.9 TYPE 1 DIABETES MELLITUS WITHOUT COMPLICATION: ICD-10-CM

## 2025-08-19 LAB
25(OH)D3+25(OH)D2 SERPL-MCNC: 26 NG/ML (ref 30–96)
ALBUMIN SERPL BCP-MCNC: 3.8 G/DL (ref 3.5–5.2)
ALP SERPL-CCNC: 43 UNIT/L (ref 40–150)
ALT SERPL W/O P-5'-P-CCNC: 13 UNIT/L (ref 0–55)
ANION GAP (OHS): 6 MMOL/L (ref 8–16)
AST SERPL-CCNC: 23 UNIT/L (ref 0–50)
BILIRUB SERPL-MCNC: 0.3 MG/DL (ref 0.1–1)
BUN SERPL-MCNC: 12 MG/DL (ref 6–20)
CALCIUM SERPL-MCNC: 8.9 MG/DL (ref 8.7–10.5)
CHLORIDE SERPL-SCNC: 110 MMOL/L (ref 95–110)
CHOLEST SERPL-MCNC: 182 MG/DL (ref 120–199)
CHOLEST/HDLC SERPL: 4.9 {RATIO} (ref 2–5)
CO2 SERPL-SCNC: 25 MMOL/L (ref 23–29)
CREAT SERPL-MCNC: 0.8 MG/DL (ref 0.5–1.4)
EAG (OHS): 146 MG/DL (ref 68–131)
ERYTHROCYTE [DISTWIDTH] IN BLOOD BY AUTOMATED COUNT: 13.4 % (ref 11.5–14.5)
GFR SERPLBLD CREATININE-BSD FMLA CKD-EPI: >60 ML/MIN/1.73/M2
GLUCOSE SERPL-MCNC: 68 MG/DL (ref 70–110)
HBA1C MFR BLD: 6.7 % (ref 4–5.6)
HCT VFR BLD AUTO: 42.9 % (ref 37–48.5)
HDLC SERPL-MCNC: 37 MG/DL (ref 40–75)
HDLC SERPL: 20.3 % (ref 20–50)
HGB BLD-MCNC: 13.9 GM/DL (ref 12–16)
LDLC SERPL CALC-MCNC: 119.6 MG/DL (ref 63–159)
MCH RBC QN AUTO: 27.3 PG (ref 27–31)
MCHC RBC AUTO-ENTMCNC: 32.4 G/DL (ref 32–36)
MCV RBC AUTO: 84 FL (ref 82–98)
NONHDLC SERPL-MCNC: 145 MG/DL
PLATELET # BLD AUTO: 243 K/UL (ref 150–450)
PMV BLD AUTO: 12.4 FL (ref 9.2–12.9)
POTASSIUM SERPL-SCNC: 5 MMOL/L (ref 3.5–5.1)
PROT SERPL-MCNC: 6.4 GM/DL (ref 6–8.4)
RBC # BLD AUTO: 5.09 M/UL (ref 4–5.4)
SODIUM SERPL-SCNC: 141 MMOL/L (ref 136–145)
TRIGL SERPL-MCNC: 127 MG/DL (ref 30–150)
WBC # BLD AUTO: 8.85 K/UL (ref 3.9–12.7)

## 2025-08-19 PROCEDURE — 82306 VITAMIN D 25 HYDROXY: CPT

## 2025-08-19 PROCEDURE — 80061 LIPID PANEL: CPT

## 2025-08-19 PROCEDURE — 36415 COLL VENOUS BLD VENIPUNCTURE: CPT | Mod: PO

## 2025-08-19 PROCEDURE — 83036 HEMOGLOBIN GLYCOSYLATED A1C: CPT

## 2025-08-19 PROCEDURE — 80053 COMPREHEN METABOLIC PANEL: CPT

## 2025-08-19 PROCEDURE — 85027 COMPLETE CBC AUTOMATED: CPT

## 2025-08-27 ENCOUNTER — PATIENT MESSAGE (OUTPATIENT)
Dept: DIABETES | Facility: CLINIC | Age: 40
End: 2025-08-27
Payer: COMMERCIAL

## 2025-09-04 ENCOUNTER — CLINICAL SUPPORT (OUTPATIENT)
Dept: DIABETES | Facility: CLINIC | Age: 40
End: 2025-09-04
Payer: COMMERCIAL

## 2025-09-04 ENCOUNTER — TELEPHONE (OUTPATIENT)
Dept: DIABETES | Facility: CLINIC | Age: 40
End: 2025-09-04
Payer: COMMERCIAL

## 2025-09-04 DIAGNOSIS — E10.9 TYPE 1 DIABETES MELLITUS WITHOUT COMPLICATION: Primary | ICD-10-CM
